# Patient Record
Sex: FEMALE | Race: WHITE | Employment: OTHER | URBAN - METROPOLITAN AREA
[De-identification: names, ages, dates, MRNs, and addresses within clinical notes are randomized per-mention and may not be internally consistent; named-entity substitution may affect disease eponyms.]

---

## 2017-02-01 ENCOUNTER — TELEPHONE (OUTPATIENT)
Dept: FAMILY MEDICINE CLINIC | Facility: CLINIC | Age: 67
End: 2017-02-01

## 2017-02-01 DIAGNOSIS — M81.0 OSTEOPOROSIS: Primary | ICD-10-CM

## 2017-02-01 RX ORDER — RISEDRONATE SODIUM 35 MG/1
TABLET, DELAYED RELEASE ORAL
Qty: 12 TABLET | Refills: 2 | Status: SHIPPED | OUTPATIENT
Start: 2017-02-01 | End: 2017-04-25

## 2017-02-01 NOTE — TELEPHONE ENCOUNTER
Patient requesting a refill of Risedronate Sodium 35 MG Oral Tab EC.  Patient would like sent into [facility] and would like a call once prescription has been sent through

## 2017-04-25 ENCOUNTER — OFFICE VISIT (OUTPATIENT)
Dept: FAMILY MEDICINE CLINIC | Facility: CLINIC | Age: 67
End: 2017-04-25

## 2017-04-25 ENCOUNTER — APPOINTMENT (OUTPATIENT)
Dept: LAB | Age: 67
End: 2017-04-25
Attending: FAMILY MEDICINE
Payer: COMMERCIAL

## 2017-04-25 VITALS
DIASTOLIC BLOOD PRESSURE: 80 MMHG | HEIGHT: 63.5 IN | BODY MASS INDEX: 28.17 KG/M2 | TEMPERATURE: 98 F | WEIGHT: 161 LBS | RESPIRATION RATE: 16 BRPM | HEART RATE: 60 BPM | SYSTOLIC BLOOD PRESSURE: 130 MMHG

## 2017-04-25 DIAGNOSIS — E55.9 VITAMIN D DEFICIENCY: ICD-10-CM

## 2017-04-25 DIAGNOSIS — M54.50 CHRONIC BILATERAL LOW BACK PAIN WITHOUT SCIATICA: ICD-10-CM

## 2017-04-25 DIAGNOSIS — I10 ESSENTIAL HYPERTENSION: Primary | ICD-10-CM

## 2017-04-25 DIAGNOSIS — G89.29 CHRONIC BILATERAL LOW BACK PAIN WITHOUT SCIATICA: ICD-10-CM

## 2017-04-25 DIAGNOSIS — E78.00 PURE HYPERCHOLESTEROLEMIA: ICD-10-CM

## 2017-04-25 DIAGNOSIS — R73.9 HYPERGLYCEMIA: ICD-10-CM

## 2017-04-25 DIAGNOSIS — Z12.31 VISIT FOR SCREENING MAMMOGRAM: ICD-10-CM

## 2017-04-25 DIAGNOSIS — M81.0 AGE RELATED OSTEOPOROSIS, UNSPECIFIED PATHOLOGICAL FRACTURE PRESENCE: ICD-10-CM

## 2017-04-25 PROCEDURE — 36415 COLL VENOUS BLD VENIPUNCTURE: CPT | Performed by: FAMILY MEDICINE

## 2017-04-25 PROCEDURE — 82306 VITAMIN D 25 HYDROXY: CPT | Performed by: FAMILY MEDICINE

## 2017-04-25 PROCEDURE — 99214 OFFICE O/P EST MOD 30 MIN: CPT | Performed by: FAMILY MEDICINE

## 2017-04-25 PROCEDURE — 80053 COMPREHEN METABOLIC PANEL: CPT | Performed by: FAMILY MEDICINE

## 2017-04-25 PROCEDURE — 80061 LIPID PANEL: CPT | Performed by: FAMILY MEDICINE

## 2017-04-25 PROCEDURE — 83036 HEMOGLOBIN GLYCOSYLATED A1C: CPT | Performed by: FAMILY MEDICINE

## 2017-04-25 RX ORDER — CITALOPRAM 40 MG/1
TABLET ORAL
Qty: 90 TABLET | Refills: 1 | Status: SHIPPED | OUTPATIENT
Start: 2017-04-25 | End: 2017-07-25

## 2017-04-25 RX ORDER — RISEDRONATE SODIUM 35 MG/1
TABLET, DELAYED RELEASE ORAL
Qty: 12 TABLET | Refills: 3 | Status: SHIPPED | OUTPATIENT
Start: 2017-04-25 | End: 2017-05-20

## 2017-04-25 RX ORDER — VALSARTAN AND HYDROCHLOROTHIAZIDE 160; 12.5 MG/1; MG/1
TABLET, FILM COATED ORAL
Qty: 90 TABLET | Refills: 1 | Status: SHIPPED | OUTPATIENT
Start: 2017-04-25 | End: 2017-07-25

## 2017-04-25 RX ORDER — METOPROLOL SUCCINATE 50 MG/1
TABLET, EXTENDED RELEASE ORAL
Qty: 90 TABLET | Refills: 1 | Status: SHIPPED | OUTPATIENT
Start: 2017-04-25 | End: 2017-07-25

## 2017-04-25 NOTE — PROGRESS NOTES
Marvin Martínez is a 61year old female. HPI:   Patient presents for a medication follow up. · Osteoporosis. On Risdronate since 2013 maybe? .  Last DEXA 1/18/16.   She takes calcium/mag and Vit D.   · HTN:  Valsarten-HCTZ 160-12.5mg daily and without murmur  EXT:  No pedal edema    ASSESSMENT AND PLAN:   1. Essential hypertension  BP controlled. CPM  - Metoprolol Succinate ER 50 MG Oral Tablet 24 Hr; TAKE 1 BY MOUTH DAILY  Dispense: 90 tablet; Refill: 1  - Valsartan-Hydrochlorothiazide 160-12.

## 2017-05-02 ENCOUNTER — HOSPITAL ENCOUNTER (OUTPATIENT)
Dept: MAMMOGRAPHY | Age: 67
Discharge: HOME | End: 2017-05-02
Attending: FAMILY MEDICINE
Payer: COMMERCIAL

## 2017-05-02 DIAGNOSIS — Z12.31 VISIT FOR SCREENING MAMMOGRAM: ICD-10-CM

## 2017-05-02 PROCEDURE — 77067 SCR MAMMO BI INCL CAD: CPT

## 2017-05-19 ENCOUNTER — PATIENT MESSAGE (OUTPATIENT)
Dept: FAMILY MEDICINE CLINIC | Facility: CLINIC | Age: 67
End: 2017-05-19

## 2017-05-19 DIAGNOSIS — M81.0 OSTEOPOROSIS, UNSPECIFIED OSTEOPOROSIS TYPE, UNSPECIFIED PATHOLOGICAL FRACTURE PRESENCE: Primary | ICD-10-CM

## 2017-05-19 NOTE — TELEPHONE ENCOUNTER
From: Neal De Los Santos  To: Sen Prather MD  Sent: 5/19/2017 1:00 PM CDT  Subject: Prescription Question    I just signed up for Medicare and am also now on the [address]. My problem is this: I currently am on Risedronate Sodium 35 mg.

## 2017-05-20 RX ORDER — ALENDRONATE SODIUM 70 MG/1
70 TABLET ORAL WEEKLY
Qty: 12 TABLET | Refills: 3 | Status: SHIPPED | OUTPATIENT
Start: 2017-05-20 | End: 2017-06-15

## 2017-05-20 NOTE — TELEPHONE ENCOUNTER
Fosamax 70mg once weekly. Administer first thing in the morning and >30 minutes before eating or drinking anything other than plain water.   Should stay upright for at least 30 minutes and until after first food of the day (to reduce esophageal irritation)

## 2017-06-15 ENCOUNTER — TELEPHONE (OUTPATIENT)
Dept: FAMILY MEDICINE CLINIC | Facility: CLINIC | Age: 67
End: 2017-06-15

## 2017-06-15 DIAGNOSIS — M81.0 OSTEOPOROSIS, UNSPECIFIED OSTEOPOROSIS TYPE, UNSPECIFIED PATHOLOGICAL FRACTURE PRESENCE: Primary | ICD-10-CM

## 2017-06-15 RX ORDER — ALENDRONATE SODIUM 70 MG/1
70 TABLET ORAL WEEKLY
Qty: 12 TABLET | Refills: 3 | Status: SHIPPED | OUTPATIENT
Start: 2017-06-15 | End: 2017-07-25

## 2017-06-15 NOTE — TELEPHONE ENCOUNTER
Alendronate Sodium 70 MG Oral Tab was sent to [address] [place] [address] but with patient's insurance she is to use [facility]. According to patient [facility] tried contacting our office needing additional information before processing order.  Patient would like us to co

## 2017-07-25 NOTE — TELEPHONE ENCOUNTER
Fax received from [facility] for request for scripts. Pt has [facility] on pharmacy list.  I called Pt to verify. Pt states has new insurance and will now need to use [facility]. Pt has refills left on all meds but unable to transfer.  [employer] will

## 2017-08-03 ENCOUNTER — TELEPHONE (OUTPATIENT)
Dept: FAMILY MEDICINE CLINIC | Facility: CLINIC | Age: 67
End: 2017-08-03

## 2017-08-09 ENCOUNTER — TELEPHONE (OUTPATIENT)
Dept: FAMILY MEDICINE CLINIC | Facility: CLINIC | Age: 67
End: 2017-08-09

## 2017-08-09 DIAGNOSIS — M81.0 OSTEOPOROSIS, UNSPECIFIED OSTEOPOROSIS TYPE, UNSPECIFIED PATHOLOGICAL FRACTURE PRESENCE: ICD-10-CM

## 2017-08-09 DIAGNOSIS — I10 ESSENTIAL HYPERTENSION: ICD-10-CM

## 2017-08-09 NOTE — TELEPHONE ENCOUNTER
Patient states she will be running out of medication by her next appointment 10-. Pt will need med refills on Metoprolol ER (Succ) 50Mg TAB, Alendronate 70mg Tab, and Valsartan/HCTZ 160.12.5 Mg tab sent to [facility].

## 2017-08-09 NOTE — TELEPHONE ENCOUNTER
This was just filled on 7/27/17 for 90 days no refills her appointment is 10/26/2017 with Dr Bob Oquendo her 90 days will run out before she is seen and mail order takes a week to get meds to her so she wants new RX sent can we send refills?

## 2017-08-10 RX ORDER — METOPROLOL SUCCINATE 50 MG/1
TABLET, EXTENDED RELEASE ORAL
Qty: 90 TABLET | Refills: 0 | Status: SHIPPED | OUTPATIENT
Start: 2017-08-10 | End: 2017-10-27

## 2017-08-10 RX ORDER — VALSARTAN AND HYDROCHLOROTHIAZIDE 160; 12.5 MG/1; MG/1
TABLET, FILM COATED ORAL
Qty: 90 TABLET | Refills: 0 | Status: SHIPPED | OUTPATIENT
Start: 2017-08-10 | End: 2017-10-27

## 2017-08-10 RX ORDER — ALENDRONATE SODIUM 70 MG/1
70 TABLET ORAL WEEKLY
Qty: 12 TABLET | Refills: 0 | Status: SHIPPED | OUTPATIENT
Start: 2017-08-10 | End: 2017-10-27

## 2017-10-10 DIAGNOSIS — F32.4 MAJOR DEPRESSIVE DISORDER WITH SINGLE EPISODE, IN PARTIAL REMISSION (HCC): ICD-10-CM

## 2017-10-11 RX ORDER — CITALOPRAM 40 MG/1
TABLET ORAL
Qty: 90 TABLET | Refills: 0 | Status: SHIPPED | OUTPATIENT
Start: 2017-10-11 | End: 2017-10-27

## 2017-10-20 ENCOUNTER — TELEPHONE (OUTPATIENT)
Dept: FAMILY MEDICINE CLINIC | Facility: CLINIC | Age: 67
End: 2017-10-20

## 2017-10-23 ENCOUNTER — MA CHART PREP (OUTPATIENT)
Dept: FAMILY MEDICINE CLINIC | Facility: CLINIC | Age: 67
End: 2017-10-23

## 2017-10-26 ENCOUNTER — OFFICE VISIT (OUTPATIENT)
Dept: FAMILY MEDICINE CLINIC | Facility: CLINIC | Age: 67
End: 2017-10-26

## 2017-10-26 VITALS
WEIGHT: 163 LBS | SYSTOLIC BLOOD PRESSURE: 138 MMHG | HEART RATE: 72 BPM | HEIGHT: 63.25 IN | RESPIRATION RATE: 16 BRPM | DIASTOLIC BLOOD PRESSURE: 80 MMHG | BODY MASS INDEX: 28.53 KG/M2 | TEMPERATURE: 98 F

## 2017-10-26 DIAGNOSIS — R73.9 HYPERGLYCEMIA: ICD-10-CM

## 2017-10-26 DIAGNOSIS — F32.4 MAJOR DEPRESSIVE DISORDER WITH SINGLE EPISODE, IN PARTIAL REMISSION (HCC): ICD-10-CM

## 2017-10-26 DIAGNOSIS — E55.9 VITAMIN D DEFICIENCY: ICD-10-CM

## 2017-10-26 DIAGNOSIS — K57.90 DIVERTICULOSIS OF INTESTINE WITHOUT BLEEDING, UNSPECIFIED INTESTINAL TRACT LOCATION: ICD-10-CM

## 2017-10-26 DIAGNOSIS — I10 ESSENTIAL HYPERTENSION: ICD-10-CM

## 2017-10-26 DIAGNOSIS — Z86.39: ICD-10-CM

## 2017-10-26 DIAGNOSIS — M81.0 OSTEOPOROSIS, UNSPECIFIED OSTEOPOROSIS TYPE, UNSPECIFIED PATHOLOGICAL FRACTURE PRESENCE: ICD-10-CM

## 2017-10-26 DIAGNOSIS — G47.33 OSA (OBSTRUCTIVE SLEEP APNEA): ICD-10-CM

## 2017-10-26 DIAGNOSIS — Z00.00 LABORATORY EXAM ORDERED AS PART OF ROUTINE GENERAL MEDICAL EXAMINATION: ICD-10-CM

## 2017-10-26 DIAGNOSIS — N95.2 VAGINAL ATROPHY: ICD-10-CM

## 2017-10-26 DIAGNOSIS — Z23 NEED FOR VACCINATION: ICD-10-CM

## 2017-10-26 DIAGNOSIS — J47.9 BRONCHIECTASIS WITHOUT COMPLICATION (HCC): ICD-10-CM

## 2017-10-26 DIAGNOSIS — K63.5 POLYP OF COLON, UNSPECIFIED PART OF COLON, UNSPECIFIED TYPE: ICD-10-CM

## 2017-10-26 DIAGNOSIS — K64.8 INTERNAL HEMORRHOIDS: ICD-10-CM

## 2017-10-26 DIAGNOSIS — Z00.00 ENCOUNTER FOR ANNUAL HEALTH EXAMINATION: Primary | ICD-10-CM

## 2017-10-26 PROCEDURE — 96160 PT-FOCUSED HLTH RISK ASSMT: CPT | Performed by: FAMILY MEDICINE

## 2017-10-26 PROCEDURE — G0009 ADMIN PNEUMOCOCCAL VACCINE: HCPCS | Performed by: FAMILY MEDICINE

## 2017-10-26 PROCEDURE — 90732 PPSV23 VACC 2 YRS+ SUBQ/IM: CPT | Performed by: FAMILY MEDICINE

## 2017-10-26 RX ORDER — ESTRADIOL 0.1 MG/G
CREAM VAGINAL
Qty: 42.5 G | Refills: 1 | Status: SHIPPED | OUTPATIENT
Start: 2017-10-26 | End: 2017-11-03

## 2017-10-26 NOTE — PROGRESS NOTES
HPI:   Eveline Johnson is a 74year old female who presents for a MA (Medicare Advantage) Supervisit (Once per calendar year). · Osteoporosis. On Risdronate since [id number](?). Switched over to Alendronate for cost savings. Last DEXA 12/2015. partial remission ([facility]     [facility] - [place] [facility]      Last Cholesterol Labs:     Lab Results  Component Value Date   CHOLEST 199 04/25/2017   HDL 53 04/25/2017    04/25/2017   TRIG 133 04/25/2017          Last Chemistr Cancer in her brother; Other in her father; Psychiatric in her daughter; other in her father. SOCIAL HISTORY:   She  reports that she has never smoked. She has never used smokeless tobacco. She reports that she drinks alcohol.  She reports that she does n and rhythm  Extremities: extremities normal, atraumatic, no cyanosis or edema    SUGGESTED VACCINATIONS - Influenza, Pneumococcal, Zoster, Tetanus     Immunization History   Administered Date(s) Administered   • Fluad High dose 72 yr and older (42647) 10/1 Epic. Discussed with patient and provided information       [facility] on file in Epic:    Felix Bo does not have a Power of  for [employer] on file in [place].  Discussed with patient and provided information the last two weeks)?: Not at all    PHQ-2 SCORE: 0        Advance Directives     Do you have a healthcare power of ?: No    Do you have a living will?: No     Please go to \"Cognitive Assessment\" under Medicare Assessment section in Charting, test Pelvic      Pap: Every 3 yrs age 21-68 or Pap+HPV every 5 yrs age 33-67, age 72 and older at high risk There are no preventive care reminders to display for this patient.  Update Health Maintenance if applicable    Chlamydia  Annually if high risk No result 0.96    No flowsheet data found. BUN  Annually BUN (mg/dL)   Date Value   04/25/2017 15     Blood Urea Nitrogen (milligrams per deciliter)   Date Value   06/23/2015 14    No flowsheet data found.      Drug Serum Conc  Annually No results found for: [address]

## 2017-10-26 NOTE — PATIENT INSTRUCTIONS
ISAAC Gil 19 SCREENING SCHEDULE   Tests on this list are recommended by your physician but may not be covered, or covered at this frequency, by your insurer. Please check with your insurance carrier before scheduling to verify coverage.    PREV any previous visit.  Limited to patients who meet one of the following criteria:   • Men who are 73-68 years old and have smoked more than 100 cigarettes in their lifetime   • Anyone with a family history    Colorectal Cancer Screening  Covered up to Age 76 Mammogram regularly   Immunizations      Influenza  Covered Annually No orders found for this or any previous visit. Please get every year    Pneumococcal 13 (Prevnar)  Covered Once after 65 No orders found for this or any previous visit.  Please get once a

## 2017-10-27 PROBLEM — J47.9 BRONCHIECTASIS WITHOUT COMPLICATION (HCC): Status: ACTIVE | Noted: 2017-10-27

## 2017-10-27 RX ORDER — METOPROLOL SUCCINATE 50 MG/1
TABLET, EXTENDED RELEASE ORAL
Qty: 90 TABLET | Refills: 1 | Status: SHIPPED | OUTPATIENT
Start: 2017-10-27 | End: 2018-08-21

## 2017-10-27 RX ORDER — CITALOPRAM 40 MG/1
40 TABLET ORAL
Qty: 90 TABLET | Refills: 1 | Status: SHIPPED | OUTPATIENT
Start: 2017-10-27 | End: 2018-02-02

## 2017-10-27 RX ORDER — VALSARTAN AND HYDROCHLOROTHIAZIDE 160; 12.5 MG/1; MG/1
TABLET, FILM COATED ORAL
Qty: 90 TABLET | Refills: 1 | Status: SHIPPED | OUTPATIENT
Start: 2017-10-27 | End: 2018-05-02

## 2017-10-27 RX ORDER — ALENDRONATE SODIUM 70 MG/1
70 TABLET ORAL WEEKLY
Qty: 12 TABLET | Refills: 1 | Status: SHIPPED | OUTPATIENT
Start: 2017-10-27 | End: 2018-08-21

## 2017-11-03 ENCOUNTER — TELEPHONE (OUTPATIENT)
Dept: FAMILY MEDICINE CLINIC | Facility: CLINIC | Age: 67
End: 2017-11-03

## 2017-11-03 RX ORDER — ESTRADIOL 0.1 MG/G
CREAM VAGINAL
Qty: 42.5 G | Refills: 1 | Status: SHIPPED | OUTPATIENT
Start: 2017-11-03 | End: 2018-02-02

## 2017-11-03 NOTE — TELEPHONE ENCOUNTER
Fax transmission requests clarification on rx for estrace. Verification needed on how many grams per application. Please complete form and fax to [phone]. Form in Dr Gonzalez Jones.   Routed to Dr Mario Corey

## 2017-11-08 ENCOUNTER — TELEPHONE (OUTPATIENT)
Dept: FAMILY MEDICINE CLINIC | Facility: CLINIC | Age: 67
End: 2017-11-08

## 2017-11-08 NOTE — TELEPHONE ENCOUNTER
Shanell Pena from [facility] requesting clarification/directions on pt's med Losartan Potassium 25 MG Oral Tab

## 2018-01-08 DIAGNOSIS — F32.4 MAJOR DEPRESSIVE DISORDER WITH SINGLE EPISODE, IN PARTIAL REMISSION (HCC): ICD-10-CM

## 2018-01-10 RX ORDER — CITALOPRAM 40 MG/1
TABLET ORAL
Qty: 90 TABLET | Refills: 0 | OUTPATIENT
Start: 2018-01-10

## 2018-01-10 NOTE — TELEPHONE ENCOUNTER
LOV 10/26/2017 and at that time, pt was given Rx for 6 months. Pt should have a refill. Denied refill request.    No future appointments.      Refill request for:      Pending Prescriptions Disp Refills    CITALOPRAM HYDROBROMIDE 40 MG Oral Tab [Pharmacy Me

## 2018-01-24 ENCOUNTER — HOSPITAL ENCOUNTER (OUTPATIENT)
Dept: BONE DENSITY | Age: 68
Discharge: HOME | End: 2018-01-24
Attending: FAMILY MEDICINE
Payer: MEDICARE

## 2018-01-24 DIAGNOSIS — M81.0 OSTEOPOROSIS, UNSPECIFIED OSTEOPOROSIS TYPE, UNSPECIFIED PATHOLOGICAL FRACTURE PRESENCE: ICD-10-CM

## 2018-01-24 PROCEDURE — 77080 DXA BONE DENSITY AXIAL: CPT | Performed by: FAMILY MEDICINE

## 2018-01-29 ENCOUNTER — TELEPHONE (OUTPATIENT)
Dept: FAMILY MEDICINE CLINIC | Facility: CLINIC | Age: 68
End: 2018-01-29

## 2018-02-02 ENCOUNTER — OFFICE VISIT (OUTPATIENT)
Dept: FAMILY MEDICINE CLINIC | Facility: CLINIC | Age: 68
End: 2018-02-02

## 2018-02-02 VITALS
SYSTOLIC BLOOD PRESSURE: 128 MMHG | WEIGHT: 169 LBS | TEMPERATURE: 98 F | DIASTOLIC BLOOD PRESSURE: 78 MMHG | HEART RATE: 72 BPM | HEIGHT: 63.5 IN | BODY MASS INDEX: 29.57 KG/M2

## 2018-02-02 DIAGNOSIS — F32.4 MAJOR DEPRESSIVE DISORDER WITH SINGLE EPISODE, IN PARTIAL REMISSION (HCC): ICD-10-CM

## 2018-02-02 DIAGNOSIS — K63.5 POLYP OF COLON, UNSPECIFIED PART OF COLON, UNSPECIFIED TYPE: ICD-10-CM

## 2018-02-02 DIAGNOSIS — M81.0 OSTEOPOROSIS, UNSPECIFIED OSTEOPOROSIS TYPE, UNSPECIFIED PATHOLOGICAL FRACTURE PRESENCE: ICD-10-CM

## 2018-02-02 DIAGNOSIS — E55.9 VITAMIN D DEFICIENCY: ICD-10-CM

## 2018-02-02 DIAGNOSIS — K57.90 DIVERTICULOSIS OF INTESTINE WITHOUT BLEEDING, UNSPECIFIED INTESTINAL TRACT LOCATION: ICD-10-CM

## 2018-02-02 DIAGNOSIS — K64.8 INTERNAL HEMORRHOIDS: ICD-10-CM

## 2018-02-02 DIAGNOSIS — J47.9 BRONCHIECTASIS WITHOUT COMPLICATION (HCC): ICD-10-CM

## 2018-02-02 DIAGNOSIS — R73.9 HYPERGLYCEMIA: ICD-10-CM

## 2018-02-02 DIAGNOSIS — Z86.39: ICD-10-CM

## 2018-02-02 DIAGNOSIS — Z00.00 ENCOUNTER FOR ANNUAL HEALTH EXAMINATION: Primary | ICD-10-CM

## 2018-02-02 DIAGNOSIS — I10 ESSENTIAL HYPERTENSION: ICD-10-CM

## 2018-02-02 DIAGNOSIS — G47.33 OSA (OBSTRUCTIVE SLEEP APNEA): ICD-10-CM

## 2018-02-02 PROCEDURE — 96160 PT-FOCUSED HLTH RISK ASSMT: CPT | Performed by: FAMILY MEDICINE

## 2018-02-02 PROCEDURE — G0438 PPPS, INITIAL VISIT: HCPCS | Performed by: FAMILY MEDICINE

## 2018-02-02 RX ORDER — CITALOPRAM 40 MG/1
40 TABLET ORAL
Qty: 90 TABLET | Refills: 1 | Status: SHIPPED | OUTPATIENT
Start: 2018-02-02 | End: 2018-08-21

## 2018-02-02 NOTE — PATIENT INSTRUCTIONS
ISAAC Gil 19 SCREENING SCHEDULE   Tests on this list are recommended by your physician but may not be covered, or covered at this frequency, by your insurer. Please check with your insurance carrier before scheduling to verify coverage.    PREV results found for this or any previous visit.  Limited to patients who meet one of the following criteria:   • Men who are 73-68 years old and have smoked more than 100 cigarettes in their lifetime   • Anyone with a family history    Colorectal Cancer Scree 05/02/2018 Please get this Mammogram regularly   Immunizations      Influenza  Covered Annually No orders found for this or any previous visit.  Please get every year    Pneumococcal 13 (Prevnar)  Covered Once after 65 No orders found for this or any previo Advance Directives.

## 2018-02-02 NOTE — PROGRESS NOTES
HPI:   Kathrin Tom is a 74year old female who presents for a MA (Medicare Advantage) Supervisit (Once per calendar year). · Osteoporosis. On Risdronate since [id number](?). Switched over to Alendronate for cost savings. Last DEXA 1/2018. below:  She has no issues with dressing and bathing based on screening of functional status. She has Walking problems based on screening of functional status.    Difficulty walking?: Yes             Depression Screening (PHQ-2/PH TRIG 133 04/25/2017          Last Chemistry Labs:     Lab Results  Component Value Date   AST 21 04/25/2017   ALT 27 04/25/2017   CA 9.2 04/25/2017   ALB 4.1 04/25/2017   CREATSERUM 0.96 04/25/2017   GLU 88 04/25/2017        CBC  (most recent labs)     L No vision or hearing concerns. No dental problems. HEART:  No chest pain or palpitations  LUNG:  No SOB, cough or wheeze  GI:  No abdominal pain. No N/V/D/C  :  No dysuria  EXT:  No joint pain.   No LE swelling  PSYCH:  No mood concerns or anxiety    E (40 mg total) by mouth once daily. Dispense: 90 tablet; Refill: 1    3. CATE (obstructive sleep apnea)  Uses CPAP    4. Vitamin D deficiency  Takes supplements    5. Hx of benign neoplasm of parathyroid gland  Surgical resection    6.  Hyperglycemia  A1c st Cardiovascular Disease Screening     LDL Annually LDL Cholesterol Calc (milligrams per deciliter)   Date Value   02/11/2013 151 (H)     LDL Cholesterol (mg/dL)   Date Value   04/25/2017 119        EKG - w/ Initial Preventative Physical Exam only, or if m mentally retarded   Persons who live in the same house as a HepB virus carrier   Homosexual men   Illicit injectable drug abusers     Tetanus Toxoid  Only covered with a cut with metal- TD and TDaP Not covered by Medicare Part B No vaccine history found Th

## 2018-05-02 DIAGNOSIS — I10 ESSENTIAL HYPERTENSION: ICD-10-CM

## 2018-05-02 RX ORDER — VALSARTAN AND HYDROCHLOROTHIAZIDE 160; 12.5 MG/1; MG/1
TABLET, FILM COATED ORAL
Qty: 90 TABLET | Refills: 1 | Status: SHIPPED | OUTPATIENT
Start: 2018-05-02 | End: 2018-07-27

## 2018-07-25 ENCOUNTER — TELEPHONE (OUTPATIENT)
Dept: FAMILY MEDICINE CLINIC | Facility: CLINIC | Age: 68
End: 2018-07-25

## 2018-07-25 NOTE — TELEPHONE ENCOUNTER
Fax received from [facility] [employer] [facility] requesting a new prescription to replace Valsartan/HCTZ 160/12.5 mg because of a 's recall. LOV 2/2/18. No future appointments. Routed to Dr Cathryn Farias.

## 2018-07-27 ENCOUNTER — TELEPHONE (OUTPATIENT)
Dept: FAMILY MEDICINE CLINIC | Facility: CLINIC | Age: 68
End: 2018-07-27

## 2018-07-27 DIAGNOSIS — Z12.31 ENCOUNTER FOR SCREENING MAMMOGRAM FOR BREAST CANCER: Primary | ICD-10-CM

## 2018-07-27 RX ORDER — LOSARTAN POTASSIUM AND HYDROCHLOROTHIAZIDE 12.5; 1 MG/1; MG/1
1 TABLET ORAL DAILY
Qty: 90 TABLET | Refills: 1 | Status: SHIPPED | OUTPATIENT
Start: 2018-07-27 | End: 2018-07-30

## 2018-07-27 NOTE — TELEPHONE ENCOUNTER
Patient came into office and dropped off [facility] for substitute for Valsartan. Also she is requesting an order for her mammogram.  Central scheduling number given to patient.   Form in triage in refill zaria/jb DM (diabetes mellitus)

## 2018-07-27 NOTE — TELEPHONE ENCOUNTER
From: Nallely Mendez  Sent: 7/27/2018 4:52 PM CDT  Subject: Medication Renewal Request    Nallely Mendez would like a refill of the following medications:     Losartan Potassium-HCTZ 100-12.5 MG Oral Tab Yoav Hopson MD]   Patient Commen

## 2018-07-27 NOTE — TELEPHONE ENCOUNTER
Notified pt that mammogram has been ordered and Losartan/HCTZ was sent to THE [facility]. Pt verbalized understandiong.

## 2018-07-30 RX ORDER — LOSARTAN POTASSIUM AND HYDROCHLOROTHIAZIDE 12.5; 1 MG/1; MG/1
1 TABLET ORAL DAILY
Qty: 90 TABLET | Refills: 1 | Status: SHIPPED
Start: 2018-07-30 | End: 2018-08-21 | Stop reason: ALTCHOICE

## 2018-07-30 NOTE — TELEPHONE ENCOUNTER
From: Forrest Peña  Sent: 7/30/2018 3:17 PM CDT  Subject: Medication Renewal Request    Forrest Peña would like a refill of the following medications:     Losartan Potassium-HCTZ 100-12.5 MG Oral Tab Dank Starr MD]   Patient Commen

## 2018-07-30 NOTE — TELEPHONE ENCOUNTER
From: Alisha Pride  Sent: 7/30/2018 9:12 AM CDT  Subject: Medication Renewal Request    Alisha Pride would like a refill of the following medications:     Losartan Potassium-HCTZ 100-12.5 MG Oral Tab Jae Linares MD]   Patient Commen

## 2018-07-31 RX ORDER — LOSARTAN POTASSIUM AND HYDROCHLOROTHIAZIDE 12.5; 1 MG/1; MG/1
1 TABLET ORAL DAILY
Qty: 90 TABLET | Refills: 1 | Status: SHIPPED | OUTPATIENT
Start: 2018-07-31 | End: 2018-08-21

## 2018-08-01 ENCOUNTER — HOSPITAL ENCOUNTER (OUTPATIENT)
Dept: MAMMOGRAPHY | Age: 68
Discharge: HOME | End: 2018-08-01
Attending: FAMILY MEDICINE
Payer: MEDICARE

## 2018-08-01 DIAGNOSIS — Z12.31 ENCOUNTER FOR SCREENING MAMMOGRAM FOR BREAST CANCER: ICD-10-CM

## 2018-08-01 PROCEDURE — 77067 SCR MAMMO BI INCL CAD: CPT | Performed by: FAMILY MEDICINE

## 2018-08-01 PROCEDURE — 77063 BREAST TOMOSYNTHESIS BI: CPT | Performed by: FAMILY MEDICINE

## 2018-08-07 ENCOUNTER — HOSPITAL ENCOUNTER (OUTPATIENT)
Dept: MAMMOGRAPHY | Facility: HOSPITAL | Age: 68
Discharge: HOME | End: 2018-08-07
Attending: FAMILY MEDICINE
Payer: MEDICARE

## 2018-08-07 DIAGNOSIS — R92.2 INCONCLUSIVE MAMMOGRAM: ICD-10-CM

## 2018-08-07 PROCEDURE — 77061 BREAST TOMOSYNTHESIS UNI: CPT | Performed by: FAMILY MEDICINE

## 2018-08-07 PROCEDURE — 77065 DX MAMMO INCL CAD UNI: CPT | Performed by: FAMILY MEDICINE

## 2018-08-07 PROCEDURE — 76642 ULTRASOUND BREAST LIMITED: CPT | Performed by: FAMILY MEDICINE

## 2018-08-21 ENCOUNTER — OFFICE VISIT (OUTPATIENT)
Dept: FAMILY MEDICINE CLINIC | Facility: CLINIC | Age: 68
End: 2018-08-21

## 2018-08-21 VITALS
SYSTOLIC BLOOD PRESSURE: 126 MMHG | BODY MASS INDEX: 30 KG/M2 | RESPIRATION RATE: 14 BRPM | DIASTOLIC BLOOD PRESSURE: 78 MMHG | WEIGHT: 171 LBS | HEART RATE: 72 BPM

## 2018-08-21 DIAGNOSIS — I10 ESSENTIAL HYPERTENSION: Primary | ICD-10-CM

## 2018-08-21 DIAGNOSIS — N95.2 VAGINAL ATROPHY: ICD-10-CM

## 2018-08-21 DIAGNOSIS — F32.4 MAJOR DEPRESSIVE DISORDER WITH SINGLE EPISODE, IN PARTIAL REMISSION (HCC): ICD-10-CM

## 2018-08-21 PROCEDURE — 99214 OFFICE O/P EST MOD 30 MIN: CPT | Performed by: FAMILY MEDICINE

## 2018-08-21 RX ORDER — METOPROLOL SUCCINATE 50 MG/1
TABLET, EXTENDED RELEASE ORAL
Qty: 90 TABLET | Refills: 1 | Status: SHIPPED | OUTPATIENT
Start: 2018-08-21 | End: 2018-12-23

## 2018-08-21 RX ORDER — CITALOPRAM 40 MG/1
40 TABLET ORAL
Qty: 90 TABLET | Refills: 1 | Status: SHIPPED | OUTPATIENT
Start: 2018-08-21 | End: 2018-12-23

## 2018-08-21 RX ORDER — ALENDRONATE SODIUM 70 MG/1
70 TABLET ORAL WEEKLY
Qty: 12 TABLET | Refills: 1 | Status: SHIPPED | OUTPATIENT
Start: 2018-08-21 | End: 2018-12-23

## 2018-08-21 RX ORDER — LOSARTAN POTASSIUM AND HYDROCHLOROTHIAZIDE 12.5; 1 MG/1; MG/1
1 TABLET ORAL DAILY
Qty: 90 TABLET | Refills: 1 | Status: SHIPPED | OUTPATIENT
Start: 2018-08-21 | End: 2018-12-23

## 2018-08-21 RX ORDER — ESTRADIOL 0.1 MG/G
CREAM VAGINAL
Qty: 42.5 G | Refills: 1 | Status: SHIPPED | OUTPATIENT
Start: 2018-08-21 | End: 2019-02-11

## 2018-08-22 NOTE — PROGRESS NOTES
Manjit Liu is a 79year old female. HPI:   Patient presents for a medication follow up. Changed to losartan with valsartan recall. BP good. No side effects. Would like to try estrace again, previously too expensive.   But would like prescription printed. Return in 6 months for medicare wellness      No orders of the defined types were placed in this encounter.     Meds & Refills for this Visit:  Signed Prescriptions Disp Refills    alendronate 70 MG Oral Tab 12 tablet 1      Sig: Ta

## 2018-09-13 ENCOUNTER — TELEPHONE (OUTPATIENT)
Dept: FAMILY MEDICINE CLINIC | Facility: CLINIC | Age: 68
End: 2018-09-13

## 2018-09-13 RX ORDER — ESTRADIOL 1 MG/1
1 TABLET ORAL DAILY
Qty: 30 TABLET | Refills: 0 | OUTPATIENT
Start: 2018-09-13

## 2018-09-13 NOTE — TELEPHONE ENCOUNTER
There is a vaginal tablet, but one her pharmacy recommended is an oral tablet.   They can not be used vaginally

## 2018-09-13 NOTE — TELEPHONE ENCOUNTER
Spoke with Jacob Lizarraga. Told her  does not recommend oral Estradiol tablets. Jacob Lizarraga was under the impression that her friend is using estrodial tablets inserted vaginally before intercourse.    She is going to explore [facility] to see if she can get

## 2018-09-13 NOTE — TELEPHONE ENCOUNTER
Corona Orta was not aware the tablets were oral, she thought they were vaginal.  Discussed with . She has 4 boxes of premarin vaginal cream sample for the patient. Please call.

## 2018-09-13 NOTE — TELEPHONE ENCOUNTER
Medication change request sent to Dr Sheela Babb original Rx was issued at appt on 8/21/18 for Estradiol 0.1mg/GM cream  PENDING is Estradiol 1 mg tabs, no refills for Dr Karin Essex consideration

## 2018-09-13 NOTE — TELEPHONE ENCOUNTER
Patient walked in to office this afternoon. She recently saw Dr. Kendy Almaraz and was given Estradiol Vaginal cream.   She found out it was very expensive over 100.00.    She went to [facility] and was told if she gets Estradial 30 tablets 1 mg with a coupon will be

## 2018-12-23 DIAGNOSIS — F32.4 MAJOR DEPRESSIVE DISORDER WITH SINGLE EPISODE, IN PARTIAL REMISSION (HCC): ICD-10-CM

## 2018-12-23 DIAGNOSIS — I10 ESSENTIAL HYPERTENSION: ICD-10-CM

## 2018-12-27 ENCOUNTER — TELEPHONE (OUTPATIENT)
Dept: FAMILY MEDICINE CLINIC | Facility: CLINIC | Age: 68
End: 2018-12-27

## 2018-12-27 RX ORDER — LOSARTAN POTASSIUM AND HYDROCHLOROTHIAZIDE 12.5; 1 MG/1; MG/1
1 TABLET ORAL DAILY
Qty: 90 TABLET | Refills: 2 | Status: SHIPPED | OUTPATIENT
Start: 2018-12-27 | End: 2019-05-15 | Stop reason: ALTCHOICE

## 2018-12-27 RX ORDER — METOPROLOL SUCCINATE 50 MG/1
TABLET, EXTENDED RELEASE ORAL
Qty: 90 TABLET | Refills: 2 | Status: SHIPPED | OUTPATIENT
Start: 2018-12-27 | End: 2019-03-25

## 2018-12-27 RX ORDER — CITALOPRAM 40 MG/1
40 TABLET ORAL
Qty: 90 TABLET | Refills: 1 | Status: SHIPPED | OUTPATIENT
Start: 2018-12-27 | End: 2019-03-25

## 2018-12-27 RX ORDER — ALENDRONATE SODIUM 70 MG/1
70 TABLET ORAL WEEKLY
Qty: 12 TABLET | Refills: 2 | Status: SHIPPED | OUTPATIENT
Start: 2018-12-27 | End: 2019-03-25

## 2018-12-27 NOTE — TELEPHONE ENCOUNTER
alendronate 70 MG Oral Tab          Sig: Take 1 tablet (70 mg total) by mouth once a week. Take in the morning and 30 minutes before eating or drinking take with plain water.     Disp:  12 tablet    Refills:  1    Osteoporosis Medication Protocol [id number]

## 2018-12-27 NOTE — TELEPHONE ENCOUNTER
No future appts scheduled  Last CMP on 4/25/17 WNL, no outstanding lab orders  LOV on 8/21/18,  with Dr Chiqui Valdivia who noted to CPM for HTN Metoprolol Succinate 50mgs  Also noted changed to losartan from valsartan due to recall  Therefore will refill these two

## 2019-01-23 ENCOUNTER — TELEPHONE (OUTPATIENT)
Dept: FAMILY MEDICINE CLINIC | Facility: CLINIC | Age: 69
End: 2019-01-23

## 2019-01-23 DIAGNOSIS — E55.9 VITAMIN D DEFICIENCY: ICD-10-CM

## 2019-01-23 DIAGNOSIS — R73.9 HYPERGLYCEMIA: Primary | ICD-10-CM

## 2019-01-23 DIAGNOSIS — E78.5 DYSLIPIDEMIA: ICD-10-CM

## 2019-01-23 NOTE — TELEPHONE ENCOUNTER
Please enter lab orders for the patient's upcoming physical appointment. Physical scheduled: 2/11/19     Preferred lab: [facility]     The patient has been notified to complete fasting labs prior to their physical appointment.

## 2019-02-05 ENCOUNTER — TELEPHONE (OUTPATIENT)
Dept: FAMILY MEDICINE CLINIC | Facility: CLINIC | Age: 69
End: 2019-02-05

## 2019-02-11 ENCOUNTER — APPOINTMENT (OUTPATIENT)
Dept: LAB | Age: 69
End: 2019-02-11
Attending: FAMILY MEDICINE
Payer: MEDICARE

## 2019-02-11 ENCOUNTER — OFFICE VISIT (OUTPATIENT)
Dept: FAMILY MEDICINE CLINIC | Facility: CLINIC | Age: 69
End: 2019-02-11
Payer: MEDICARE

## 2019-02-11 VITALS
RESPIRATION RATE: 17 BRPM | SYSTOLIC BLOOD PRESSURE: 114 MMHG | WEIGHT: 168 LBS | DIASTOLIC BLOOD PRESSURE: 60 MMHG | HEART RATE: 80 BPM | BODY MASS INDEX: 29 KG/M2

## 2019-02-11 DIAGNOSIS — N39.3 STRESS INCONTINENCE: ICD-10-CM

## 2019-02-11 DIAGNOSIS — R73.9 HYPERGLYCEMIA: ICD-10-CM

## 2019-02-11 DIAGNOSIS — E78.5 DYSLIPIDEMIA: ICD-10-CM

## 2019-02-11 DIAGNOSIS — M81.0 AGE-RELATED OSTEOPOROSIS WITHOUT CURRENT PATHOLOGICAL FRACTURE: ICD-10-CM

## 2019-02-11 DIAGNOSIS — E55.9 VITAMIN D DEFICIENCY: ICD-10-CM

## 2019-02-11 DIAGNOSIS — I10 ESSENTIAL HYPERTENSION: ICD-10-CM

## 2019-02-11 DIAGNOSIS — G47.33 OSA (OBSTRUCTIVE SLEEP APNEA): ICD-10-CM

## 2019-02-11 DIAGNOSIS — Z00.00 ENCOUNTER FOR ANNUAL HEALTH EXAMINATION: Primary | ICD-10-CM

## 2019-02-11 DIAGNOSIS — J47.9 BRONCHIECTASIS WITHOUT COMPLICATION (HCC): ICD-10-CM

## 2019-02-11 DIAGNOSIS — Z11.59 NEED FOR HEPATITIS C SCREENING TEST: ICD-10-CM

## 2019-02-11 DIAGNOSIS — F32.4 MAJOR DEPRESSIVE DISORDER WITH SINGLE EPISODE, IN PARTIAL REMISSION (HCC): ICD-10-CM

## 2019-02-11 DIAGNOSIS — K64.8 INTERNAL HEMORRHOIDS: ICD-10-CM

## 2019-02-11 DIAGNOSIS — K63.5 POLYP OF COLON, UNSPECIFIED PART OF COLON, UNSPECIFIED TYPE: ICD-10-CM

## 2019-02-11 DIAGNOSIS — Z86.39: ICD-10-CM

## 2019-02-11 DIAGNOSIS — K57.90 DIVERTICULOSIS OF INTESTINE WITHOUT BLEEDING, UNSPECIFIED INTESTINAL TRACT LOCATION: ICD-10-CM

## 2019-02-11 LAB
ALBUMIN SERPL-MCNC: 3.7 G/DL (ref 3.1–4.5)
ALBUMIN/GLOB SERPL: 0.9 {RATIO} (ref 1–2)
ALP LIVER SERPL-CCNC: 64 U/L (ref 55–142)
ALT SERPL-CCNC: 21 U/L (ref 14–54)
ANION GAP SERPL CALC-SCNC: 7 MMOL/L (ref 0–18)
AST SERPL-CCNC: 14 U/L (ref 15–41)
BILIRUB SERPL-MCNC: 0.5 MG/DL (ref 0.1–2)
BUN BLD-MCNC: 15 MG/DL (ref 8–20)
BUN/CREAT SERPL: 14.2 (ref 10–20)
CALCIUM BLD-MCNC: 8.8 MG/DL (ref 8.3–10.3)
CHLORIDE SERPL-SCNC: 104 MMOL/L (ref 101–111)
CHOLEST SMN-MCNC: 194 MG/DL (ref ?–200)
CO2 SERPL-SCNC: 30 MMOL/L (ref 22–32)
CREAT BLD-MCNC: 1.06 MG/DL (ref 0.55–1.02)
EST. AVERAGE GLUCOSE BLD GHB EST-MCNC: 140 MG/DL (ref 68–126)
GLOBULIN PLAS-MCNC: 4.3 G/DL (ref 2.8–4.4)
GLUCOSE BLD-MCNC: 109 MG/DL (ref 70–99)
HBA1C MFR BLD HPLC: 6.5 % (ref ?–5.7)
HCV AB SERPL QL IA: NONREACTIVE
HDLC SERPL-MCNC: 44 MG/DL (ref 40–59)
LDLC SERPL CALC-MCNC: 117 MG/DL (ref ?–100)
M PROTEIN MFR SERPL ELPH: 8 G/DL (ref 6.4–8.2)
NONHDLC SERPL-MCNC: 150 MG/DL (ref ?–130)
OSMOLALITY SERPL CALC.SUM OF ELEC: 293 MOSM/KG (ref 275–295)
POTASSIUM SERPL-SCNC: 3.9 MMOL/L (ref 3.6–5.1)
SODIUM SERPL-SCNC: 141 MMOL/L (ref 136–144)
TRIGL SERPL-MCNC: 164 MG/DL (ref 30–149)
VIT D+METAB SERPL-MCNC: 100.8 NG/ML (ref 30–100)
VLDLC SERPL CALC-MCNC: 33 MG/DL (ref 0–30)

## 2019-02-11 PROCEDURE — 96160 PT-FOCUSED HLTH RISK ASSMT: CPT | Performed by: FAMILY MEDICINE

## 2019-02-11 PROCEDURE — 83036 HEMOGLOBIN GLYCOSYLATED A1C: CPT

## 2019-02-11 PROCEDURE — 86803 HEPATITIS C AB TEST: CPT

## 2019-02-11 PROCEDURE — G0439 PPPS, SUBSEQ VISIT: HCPCS | Performed by: FAMILY MEDICINE

## 2019-02-11 PROCEDURE — 80053 COMPREHEN METABOLIC PANEL: CPT

## 2019-02-11 PROCEDURE — 99397 PER PM REEVAL EST PAT 65+ YR: CPT | Performed by: FAMILY MEDICINE

## 2019-02-11 PROCEDURE — 80061 LIPID PANEL: CPT

## 2019-02-11 PROCEDURE — 82306 VITAMIN D 25 HYDROXY: CPT

## 2019-02-11 RX ORDER — BORON CITRATE 3 MG
7 TABLET ORAL
COMMUNITY

## 2019-02-11 RX ORDER — CLONAZEPAM 1 MG/1
1 TABLET ORAL DAILY PRN
Qty: 30 TABLET | Refills: 0 | Status: SHIPPED | OUTPATIENT
Start: 2019-02-11

## 2019-02-11 RX ORDER — ESTRADIOL 0.1 MG/G
CREAM VAGINAL
Qty: 42.5 G | Refills: 1 | Status: SHIPPED | OUTPATIENT
Start: 2019-02-11 | End: 2019-09-27

## 2019-02-11 NOTE — PATIENT INSTRUCTIONS
ISAAC Gil 19 SCREENING SCHEDULE   Tests on this list are recommended by your physician but may not be covered, or covered at this frequency, by your insurer. Please check with your insurance carrier before scheduling to verify coverage.    PREV patients who meet one of the following criteria:   • Men who are 73-68 years old and have smoked more than 100 cigarettes in their lifetime   • Anyone with a family history    Colorectal Cancer Screening  Covered up to Age 76     Colonoscopy Screen   Cover Influenza  Covered Annually No orders found for this or any previous visit. Please get every year    Pneumococcal 13 (Prevnar)  Covered Once after 65 No orders found for this or any previous visit.  Please get once after your 65th birthday    Pneumococcal 2

## 2019-02-11 NOTE — PROGRESS NOTES
HPI:   Tomeka Fournier is a 79year old female who presents for a MA (Medicare Advantage) Supervisit (Once per calendar year). · Osteoporosis. On Risdronate since [id number](?). Switched over to Alendronate for cost savings. Last DEXA 1/2018. document plan in chart below     Functional Ability/Status   Aparna Vega has some abnormal functions as listed below:  She has no issues with dressing and bathing based on screening of functional status.                              She has [employer] Component Value Date    AST 21 04/25/2017    ALT 27 04/25/2017    CA 9.2 04/25/2017    ALB 4.1 04/25/2017    CREATSERUM 0.96 04/25/2017    GLU 88 04/25/2017        CBC  (most recent labs)   Lab Results   Component Value Date    WBC 8.7 01/11/2016    HGB gums normal  Neck: no adenopathy, no carotid bruit, no JVD, supple, symmetrical, trachea midline and thyroid not enlarged, symmetric, no tenderness/mass/nodules  Lungs: clear to auscultation bilaterally  Heart: S1, S2 normal, no murmur, click, rub or cotton healthy diet, lifestyle, and exercise. F/u in 8/2019. Okay to refill medication until then.     Lesley Ring MD, 2/11/2019     General Health            This section provided for quick review of chart, separate sheet to patient  PREVENTATIVE SERVICES Update Health Maintenance if applicable     Immunizations (Update Immunization Activity if applicable)     Influenza  Covered Annually 9/19/2018 Please get every year    Pneumococcal 13 (Prevnar)  Covered Once after 65 06/18/2015 Please get once after your

## 2019-03-08 ENCOUNTER — TELEPHONE (OUTPATIENT)
Dept: FAMILY MEDICINE CLINIC | Facility: CLINIC | Age: 69
End: 2019-03-08

## 2019-03-08 DIAGNOSIS — I10 ESSENTIAL HYPERTENSION: Primary | ICD-10-CM

## 2019-03-08 RX ORDER — LISINOPRIL AND HYDROCHLOROTHIAZIDE 25; 20 MG/1; MG/1
1 TABLET ORAL DAILY
Qty: 90 TABLET | Refills: 0 | Status: SHIPPED | OUTPATIENT
Start: 2019-03-08 | End: 2019-05-15

## 2019-03-08 NOTE — TELEPHONE ENCOUNTER
Lisinopril hct 20-25mg #90 no refill. Recommend she come in for nurse visit BP check in 3 weeks to be sure BP okay.

## 2019-03-08 NOTE — TELEPHONE ENCOUNTER
Patient dropped off a list of medications that would be covered under her insurance to find a replacement for Losartan.  Please see highlighted medication from list.  Preferred pharmacy [facility]  Any questions contact patient at home number [phone]

## 2019-03-08 NOTE — TELEPHONE ENCOUNTER
Sent medication in to mail order called patient and informed her of new script and need for appointment she will make appointment after she starts taking the medication

## 2019-03-25 DIAGNOSIS — M81.0 AGE-RELATED OSTEOPOROSIS WITHOUT CURRENT PATHOLOGICAL FRACTURE: Primary | ICD-10-CM

## 2019-03-25 DIAGNOSIS — I10 ESSENTIAL HYPERTENSION: ICD-10-CM

## 2019-03-25 DIAGNOSIS — F32.4 MAJOR DEPRESSIVE DISORDER WITH SINGLE EPISODE, IN PARTIAL REMISSION (HCC): ICD-10-CM

## 2019-03-27 RX ORDER — METOPROLOL SUCCINATE 50 MG/1
TABLET, EXTENDED RELEASE ORAL
Qty: 90 TABLET | Refills: 2 | Status: SHIPPED | OUTPATIENT
Start: 2019-03-27 | End: 2019-05-15 | Stop reason: ALTCHOICE

## 2019-03-27 RX ORDER — ALENDRONATE SODIUM 70 MG/1
70 TABLET ORAL WEEKLY
Qty: 12 TABLET | Refills: 2 | Status: SHIPPED | OUTPATIENT
Start: 2019-03-27 | End: 2019-10-31

## 2019-03-27 RX ORDER — CITALOPRAM 40 MG/1
40 TABLET ORAL
Qty: 90 TABLET | Refills: 1 | Status: SHIPPED | OUTPATIENT
Start: 2019-03-27 | End: 2019-10-31

## 2019-03-27 NOTE — TELEPHONE ENCOUNTER
2 meds passed protocol checks refilled per protocol citalopram not on protocol LOV 2/11/2019 with Dr Kyle Kothari

## 2019-05-15 ENCOUNTER — OFFICE VISIT (OUTPATIENT)
Dept: FAMILY MEDICINE CLINIC | Facility: CLINIC | Age: 69
End: 2019-05-15
Payer: MEDICARE

## 2019-05-15 ENCOUNTER — HOSPITAL ENCOUNTER (OUTPATIENT)
Dept: GENERAL RADIOLOGY | Age: 69
Discharge: HOME | End: 2019-05-15
Attending: PHYSICIAN ASSISTANT
Payer: MEDICARE

## 2019-05-15 VITALS
HEART RATE: 72 BPM | RESPIRATION RATE: 16 BRPM | HEIGHT: 62.5 IN | WEIGHT: 169 LBS | TEMPERATURE: 98 F | DIASTOLIC BLOOD PRESSURE: 80 MMHG | BODY MASS INDEX: 30.32 KG/M2 | SYSTOLIC BLOOD PRESSURE: 132 MMHG

## 2019-05-15 DIAGNOSIS — I10 ESSENTIAL HYPERTENSION: Primary | ICD-10-CM

## 2019-05-15 DIAGNOSIS — M53.3 SACROILIAC PAIN: ICD-10-CM

## 2019-05-15 DIAGNOSIS — G89.29 CHRONIC LEFT-SIDED LOW BACK PAIN WITHOUT SCIATICA: ICD-10-CM

## 2019-05-15 DIAGNOSIS — M54.50 CHRONIC LEFT-SIDED LOW BACK PAIN WITHOUT SCIATICA: ICD-10-CM

## 2019-05-15 PROBLEM — N18.30 CKD (CHRONIC KIDNEY DISEASE) STAGE 3, GFR 30-59 ML/MIN (HCC): Chronic | Status: ACTIVE | Noted: 2019-05-15

## 2019-05-15 PROCEDURE — 72202 X-RAY EXAM SI JOINTS 3/> VWS: CPT | Performed by: PHYSICIAN ASSISTANT

## 2019-05-15 PROCEDURE — 99214 OFFICE O/P EST MOD 30 MIN: CPT | Performed by: PHYSICIAN ASSISTANT

## 2019-05-15 RX ORDER — LISINOPRIL AND HYDROCHLOROTHIAZIDE 25; 20 MG/1; MG/1
1 TABLET ORAL DAILY
Qty: 90 TABLET | Refills: 1 | Status: SHIPPED | OUTPATIENT
Start: 2019-05-15 | End: 2019-10-09

## 2019-05-15 NOTE — PATIENT INSTRUCTIONS
Goal is less than 140/90, ideal would be less than 130/90. Watch for dizziness especially when standing and if blood pressures fall below 115/70.

## 2019-05-15 NOTE — PROGRESS NOTES
Patient presents with:  HTN: F/U on HTN and refill lisinopril       HISTORY OF PRESENT ILLNESS  Juan Robles is a 79year old female who presents for hypertension follow up.     - Hypertension: She has been taking lisinopril–hydrochlorothiazide 20- by mouth daily as needed for Anxiety. , Disp: 30 tablet, Rfl: 0  •  Estradiol (ESTRACE) 0.1 MG/GM Vaginal Cream, Use 2gm twice weekly x 2 weeks then 2gm 1-2 times weekly, Disp: 42.5 g, Rfl: 1  •  Co-Enzyme Q-10 30 MG Oral Cap, , Disp: , Rfl:   •  Calcium-Ma that she continue to monitor this at home. Goal less than 140/90. Recommend monitoring diet and exercise. Refilled lisinopril-HCTZ 20-25 mg daily.  Return in 6 mo.    (M53.3) Sacroiliac pain  (M54.5,  G89.29) Chronic left-sided low back pain without sciatic

## 2019-05-22 ENCOUNTER — OFFICE VISIT (OUTPATIENT)
Dept: PHYSICAL THERAPY | Age: 69
End: 2019-05-22
Attending: PHYSICIAN ASSISTANT
Payer: MEDICARE

## 2019-05-22 PROCEDURE — 97161 PT EVAL LOW COMPLEX 20 MIN: CPT

## 2019-05-22 PROCEDURE — 97110 THERAPEUTIC EXERCISES: CPT

## 2019-05-22 NOTE — PROGRESS NOTES
SPINE EVALUATION:   Referring Physician: Dr. Valorie Souza  Diagnosis:  Lumbar spine dysfunction Scoliosis Date of Service: 5/22/2019     PATIENT SUMMARY   Cayetano Harry is a 79year old female who presents to therapy today with complaints of c R.   Abdominal and trunk extensor strength < 3/5     Flexibility:   LE   Hip Flexor: decreased L  Hamstrings: decreased  Piriformis: decreased       Special tests: negative neural tension. Leg length assessment supine difficult due t body habitus.   With AS and return this letter via fax as soon as possible to [phone].  If you have any questions, please contact me at Dept: [phone]    Sincerely,  Electronically signed by therapist: Tio Winston PT    Physician's certification required: Yes  I ce

## 2019-05-29 ENCOUNTER — OFFICE VISIT (OUTPATIENT)
Dept: PHYSICAL THERAPY | Age: 69
End: 2019-05-29
Attending: PHYSICIAN ASSISTANT
Payer: MEDICARE

## 2019-05-29 PROCEDURE — 97112 NEUROMUSCULAR REEDUCATION: CPT

## 2019-05-29 PROCEDURE — 97110 THERAPEUTIC EXERCISES: CPT

## 2019-05-29 NOTE — PROGRESS NOTES
Dx: Lumbar spine dysfunction, scoliosis         Authorized # of Visits:  medicare 8 approved by 7/18/2019. Subjective: Pt reports did her initial HEP as asked. Noticed increased back symptoms daily, has stopped HEP.  Reports she has added ~ 1/8 to pt can perform standing activities such as food preparation with little to no back pain or difficulty. -Decrease excessive L LE loading in stance phase of gait so pt can ambulate in the community with minimal back or difficulty.   -Return to regular exerci

## 2019-06-05 ENCOUNTER — OFFICE VISIT (OUTPATIENT)
Dept: PHYSICAL THERAPY | Age: 69
End: 2019-06-05
Attending: PHYSICIAN ASSISTANT
Payer: MEDICARE

## 2019-06-05 PROCEDURE — 97112 NEUROMUSCULAR REEDUCATION: CPT

## 2019-06-05 NOTE — PROGRESS NOTES
Dx: Lumbar spine dysfunction, scoliosis         Authorized # of Visits:  medicare 8 approved by 7/18/2019. Subjective: Reports good HEP compliancy. Had a full day of \"feeling great\" after last session.  Continues to have L back pain as the day pr compression to lumbar spine. HO issued for additional HEP. Goals:   To be met in 8 sessions    -Improve abdominal strength as noted with biofeedback pressure cuff to 40 mm/hg or > so pt can perform bed and chair transfers with little to no difficulty

## 2019-06-10 ENCOUNTER — OFFICE VISIT (OUTPATIENT)
Dept: PHYSICAL THERAPY | Age: 69
End: 2019-06-10
Attending: PHYSICIAN ASSISTANT
Payer: MEDICARE

## 2019-06-10 PROCEDURE — 97110 THERAPEUTIC EXERCISES: CPT

## 2019-06-10 PROCEDURE — 97112 NEUROMUSCULAR REEDUCATION: CPT

## 2019-06-10 NOTE — PROGRESS NOTES
Dx: Lumbar spine dysfunction, scoliosis         Authorized # of Visits:  medicare 8 approved by 7/18/2019. Subjective: After last tx had 2 consecutive days of of little to no back complaints including walking 3 1/2 miles.  Pain began to return to L pelvis  X 10  (add HEP)  Hook lying:   Ab brace with phase 1 curl up x 10 reps  2 sets (add/progress HEP)       4 pt kneel with        Cat/camel maneuver for lumbar segment and neural self mobilizations  X 10  (add HEP)  R side:  Soft roll under R IC, corre min  Total Treatment Time:  45 min

## 2019-06-19 ENCOUNTER — OFFICE VISIT (OUTPATIENT)
Dept: PHYSICAL THERAPY | Age: 69
End: 2019-06-19
Attending: PHYSICIAN ASSISTANT
Payer: MEDICARE

## 2019-06-19 PROCEDURE — 97112 NEUROMUSCULAR REEDUCATION: CPT

## 2019-06-19 PROCEDURE — 97110 THERAPEUTIC EXERCISES: CPT

## 2019-06-19 NOTE — PROGRESS NOTES
Dx: Lumbar spine dysfunction, scoliosis         Authorized # of Visits:  medicare 8 approved by 7/18/2019. Subjective:  Pt reports there has been a noticeable consistent improvement with back pain with all transfers. No back pain with stairs.   Has lying: Ab brace with phase 1 curl up x 10 reps  3 sets (progress HEP)      With bent knee raises (marching x 10 each  (add HEP) Hook lying:   Ab brace with bridge  X 10  -add 3 sec holds  (cont HEP) As above with bent knee fallouts and bent knee raise  (wi little to no back pain or difficulty. In progress  -Decrease excessive L LE loading in stance phase of gait so pt can ambulate in the community with minimal back or difficulty. In progress  -Return to regular exercise at her fitness center/gym.  In progress

## 2019-06-24 ENCOUNTER — OFFICE VISIT (OUTPATIENT)
Dept: PHYSICAL THERAPY | Age: 69
End: 2019-06-24
Attending: PHYSICIAN ASSISTANT
Payer: MEDICARE

## 2019-06-24 PROCEDURE — 97112 NEUROMUSCULAR REEDUCATION: CPT

## 2019-06-24 NOTE — PROGRESS NOTES
Dx: Lumbar spine dysfunction, scoliosis         Authorized # of Visits:  medicare 8 approved by 7/18/2019. Subjective: Has had little to no L back or hip pain since last seen. Jacky Jackson as asked. Some R gluteal soreness, otherwise n/c with HEP. and neural self mobilizations  X 10  (cont HEP) NM re ed:  Hook lying:  Use biofeedback presure cuff  - with draw in maneuver  50 mm/hg Hook lying: Ab brace with phase 1 curl up x 10 reps  3 sets (progress HEP) Prone:   Alternate hip ext SLR, progress to f 4 met.         Goals: To be met in 8 sessions    -Improve abdominal strength as noted with biofeedback pressure cuff to 40 mm/hg or > so pt can perform bed and chair transfers with little to no difficulty or back pain.  Met  -Improve back extension strengt

## 2019-07-03 ENCOUNTER — OFFICE VISIT (OUTPATIENT)
Dept: PHYSICAL THERAPY | Age: 69
End: 2019-07-03
Attending: PHYSICIAN ASSISTANT
Payer: MEDICARE

## 2019-07-03 PROCEDURE — 97112 NEUROMUSCULAR REEDUCATION: CPT

## 2019-07-03 PROCEDURE — 97140 MANUAL THERAPY 1/> REGIONS: CPT

## 2019-07-03 PROCEDURE — 97110 THERAPEUTIC EXERCISES: CPT

## 2019-07-03 NOTE — PROGRESS NOTES
Dx: Lumbar spine dysfunction, scoliosis         Authorized # of Visits:  medicare 8 approved by 7/18/2019. Subjective: Overall doing well. Original c/o L back and hip pain appears resolved. None present with any current actity recently.   Has some wall   X 10 2 sets  (add HEP) NM re ed: 35 min  Back to wall in L side lying:  -add ab brace  -cues for neutral spine/hips  -add R hip ABD slides into wall   X 10 3 sets  (cont/progress HEP) R side:  Soft roll under R IC, correct scoliosis, hip shift  X 2 Stand:  Lateral walk out (multifidus)  -v/c t/c for neutral spine and pelvis  Use 7.5 wt with cable machine  X 10 each side    Again with blue band, progress to 2-3 step s oput each side  X 10 each side  (new HEP)      4 pt kneel with        Cat/camel ross L LE loading in stance phase of gait so pt can ambulate in the community with minimal back or difficulty. In progress  -Return to regular exercise at her fitness center/gym.  Met  -Little to no difficulty amb stairs reciprocally, 1 rail assist.  Met  -Indep

## 2019-07-10 ENCOUNTER — OFFICE VISIT (OUTPATIENT)
Dept: PHYSICAL THERAPY | Age: 69
End: 2019-07-10
Attending: PHYSICIAN ASSISTANT
Payer: MEDICARE

## 2019-07-10 PROCEDURE — 97140 MANUAL THERAPY 1/> REGIONS: CPT

## 2019-07-10 PROCEDURE — 97110 THERAPEUTIC EXERCISES: CPT

## 2019-07-10 PROCEDURE — 97112 NEUROMUSCULAR REEDUCATION: CPT

## 2019-07-10 NOTE — PROGRESS NOTES
Dx: Lumbar spine dysfunction, scoliosis         Authorized # of Visits:  medicare 8 approved by 7/18/2019. Physical Therapy Discharge Report  8 of 8 sessions completed    Subjective: Overall doing well.  Has little to no symptoms with any current ac rail assist.  Met  -Independent in progressive HEP. Met    Plan: .  D/c therapy     Charges:   nm re ed 2 there x 1 man 1     Total Timed Treatment:  55 min  Total Treatment Time:  55 min        Date: 5/29/2019 TX#: 2/ Date:      6/6         TX#: 3/   Date: re ed: 35 min  Back to wall in L side lying:  -add ab brace  -cues for neutral spine/hips  -add R hip ABD slides into wall   X 10 3 sets  (cont/progress HEP) R side:  Soft roll under R IC, correct scoliosis, hip shift  X 2 min   (cont HEP) NM re ed: 5 min HEP)  Hook lying:   Ab brace with phase 1 curl up x 10 reps  2 sets (add/progress HEP) Stand:  Side to wall, L LE against wall, push with slide up and against,  Maintain neutral pelvis  -v/c t/c for hip symmetry (move hips to L and maintain)  X 10  -again a

## 2019-07-17 ENCOUNTER — APPOINTMENT (OUTPATIENT)
Dept: PHYSICAL THERAPY | Age: 69
End: 2019-07-17
Attending: PHYSICIAN ASSISTANT
Payer: MEDICARE

## 2019-07-21 NOTE — TELEPHONE ENCOUNTER
Fax received from THE [facility] stating that they received Rx for Citalopram 40 mg daily. The maximum recommended dosage for geriatric patients is Citalopram 20 mg daily. [facility] is asking for clarification of the dosing which is over the maximum dosing.
From Dr Kelly Levine. Kennedy Schumacher has been stable on the 40 mg since January 2016. Recommend filling at that dose.
Response faxed back to THE [facility].
WDL
WDL

## 2019-07-24 ENCOUNTER — APPOINTMENT (OUTPATIENT)
Dept: PHYSICAL THERAPY | Age: 69
End: 2019-07-24
Attending: PHYSICIAN ASSISTANT
Payer: MEDICARE

## 2019-09-18 DIAGNOSIS — F32.4 MAJOR DEPRESSIVE DISORDER WITH SINGLE EPISODE, IN PARTIAL REMISSION (HCC): ICD-10-CM

## 2019-09-18 NOTE — TELEPHONE ENCOUNTER
LOV 5/15/2019     Patient was asked to follow-up in: 8/2019    Appointment scheduled: Visit date not found     Refill request for:    Requested Prescriptions     Pending Prescriptions Disp Refills   • CITALOPRAM HYDROBROMIDE 40 MG Oral Tab [Pharmacy Med Na

## 2019-09-19 NOTE — TELEPHONE ENCOUNTER
Patient scheduled appointment for 10/31 with Dr Kamari Church, states does have enough medication to get to appointment

## 2019-09-20 RX ORDER — CITALOPRAM 40 MG/1
TABLET ORAL
Qty: 90 TABLET | Refills: 1 | OUTPATIENT
Start: 2019-09-20

## 2019-09-27 DIAGNOSIS — N95.2 VAGINAL ATROPHY: Primary | ICD-10-CM

## 2019-10-02 RX ORDER — ESTRADIOL 0.1 MG/G
CREAM VAGINAL
Qty: 42.5 G | Refills: 0 | Status: SHIPPED | OUTPATIENT
Start: 2019-10-02 | End: 2020-06-04

## 2019-10-02 NOTE — TELEPHONE ENCOUNTER
LOV 5/15/19, next appt 10/31/19, last pap 1/18/16, last mammogram 8/7/18.  Pt will be reminded at upcoming appt regarding mammo  Refilled Estrace per protocol

## 2019-10-07 ENCOUNTER — OFFICE VISIT (OUTPATIENT)
Dept: FAMILY MEDICINE CLINIC | Facility: CLINIC | Age: 69
End: 2019-10-07
Payer: MEDICARE

## 2019-10-07 ENCOUNTER — TELEPHONE (OUTPATIENT)
Dept: FAMILY MEDICINE CLINIC | Facility: CLINIC | Age: 69
End: 2019-10-07

## 2019-10-07 VITALS
HEART RATE: 96 BPM | RESPIRATION RATE: 16 BRPM | TEMPERATURE: 100 F | DIASTOLIC BLOOD PRESSURE: 70 MMHG | SYSTOLIC BLOOD PRESSURE: 118 MMHG | HEIGHT: 62.5 IN | WEIGHT: 168.63 LBS | BODY MASS INDEX: 30.26 KG/M2

## 2019-10-07 DIAGNOSIS — J06.9 ACUTE URI: Primary | ICD-10-CM

## 2019-10-07 PROCEDURE — 99213 OFFICE O/P EST LOW 20 MIN: CPT | Performed by: NURSE PRACTITIONER

## 2019-10-07 NOTE — TELEPHONE ENCOUNTER
Called and talked to patient told she needs to be seen today made an appointment with Kyle Esposito FNP for today at 1:30

## 2019-10-07 NOTE — PROGRESS NOTES
Patient presents with:  Cough: Cough, wheezing, vomited 1x for 1 day. HPI:  Presents with 1 day history of chills (did not check temperature), wheezing, occasional cough without production and one time episode of emesis at approx 2AM this morning.  Dave Child breakfast and 1 tab at dinner Disp:  Rfl:    Cholecalciferol 5000 units Oral Tab Take 1 tablet by mouth daily. Disp:  Rfl:    MAGNESIUM GLYCINATE PLUS OR Take 120 mg by mouth.  Takes 1 tab at breakfast and 3 tabs before bed Disp:  Rfl:    Strontium Chloride patient instructions, routine nasal saline sinus rinses, warm salt water gargles, as per patient instructions, along with supportive care-increased fluids/rest. Instructed to notify office if not improved in 3-4 days or if symptoms worsen.  Verbalized under

## 2019-10-07 NOTE — TELEPHONE ENCOUNTER
Last night she was cold and threw up.  Hears rumbling/ wheezing in her chest and has concerns as she has had Pneumonia in the past.. requesting to speak to a nurse first

## 2019-10-07 NOTE — PATIENT INSTRUCTIONS
Gargle with warm salt water solution 3-5 times daily. Dissolve 1/2 teaspoon salt in half cup of warm tap water. Gargle and spit.      Try a premixed saline nasal spray, available over the counter, such as [place] Nasal Spray (or generic equi

## 2019-10-09 ENCOUNTER — TELEPHONE (OUTPATIENT)
Dept: FAMILY MEDICINE CLINIC | Facility: CLINIC | Age: 69
End: 2019-10-09

## 2019-10-09 RX ORDER — AMOXICILLIN AND CLAVULANATE POTASSIUM 875; 125 MG/1; MG/1
1 TABLET, FILM COATED ORAL 2 TIMES DAILY
Qty: 20 TABLET | Refills: 0 | Status: SHIPPED | OUTPATIENT
Start: 2019-10-09 | End: 2019-10-19

## 2019-10-09 NOTE — TELEPHONE ENCOUNTER
Patient calling states is not getting better, was in office on 10/07 and is starting to have with lots of yellow phlegm, wants to know if can get something else called in to pharmacy?

## 2019-10-09 NOTE — TELEPHONE ENCOUNTER
Called and talked to patient she is not getting better now coughing up yellow phlegm.  If something sent in wants to use the [facility] on [id number] [facility], [address] [place] [address]

## 2019-10-09 NOTE — TELEPHONE ENCOUNTER
Prescription for Augmentin sent to requested pharmacy, 1 tab VID for 10 days. Take all tabs and continue saline sinus rinses. Instructed to notify office if not improved in 4-5 days or if symptoms worsen. Thanks.

## 2019-10-09 NOTE — TELEPHONE ENCOUNTER
called patient and informed her of new prescription she will get this filled and let us know if not getting better

## 2019-10-18 ENCOUNTER — TELEPHONE (OUTPATIENT)
Dept: FAMILY MEDICINE CLINIC | Facility: CLINIC | Age: 69
End: 2019-10-18

## 2019-10-18 NOTE — TELEPHONE ENCOUNTER
Patient called states finished the amoxicillin and is still not feeling better, is coughing up yellow and green phlegm, wants to know if can call something else in or would have to come in? no peripheral edema/no palpitations

## 2019-10-18 NOTE — TELEPHONE ENCOUNTER
Called patient, C/O coughing up yellow phlegm, which causes a \"coughing fit\" and throat irritation. completed course of Augmentin x 10 days  Questioned if she should be on another course of antibiotic?     Explained that while uncomfortable, the coughing i

## 2019-10-21 NOTE — TELEPHONE ENCOUNTER
Agree with response from Cinthia Riggins. If cough is still bothersome can return to office for further eval. Thanks.

## 2019-10-31 ENCOUNTER — OFFICE VISIT (OUTPATIENT)
Dept: FAMILY MEDICINE CLINIC | Facility: CLINIC | Age: 69
End: 2019-10-31
Payer: MEDICARE

## 2019-10-31 VITALS
RESPIRATION RATE: 16 BRPM | SYSTOLIC BLOOD PRESSURE: 110 MMHG | TEMPERATURE: 98 F | WEIGHT: 166 LBS | HEIGHT: 63.5 IN | BODY MASS INDEX: 29.05 KG/M2 | DIASTOLIC BLOOD PRESSURE: 60 MMHG | HEART RATE: 84 BPM

## 2019-10-31 DIAGNOSIS — R73.9 HYPERGLYCEMIA: Primary | ICD-10-CM

## 2019-10-31 DIAGNOSIS — N18.30 CKD (CHRONIC KIDNEY DISEASE) STAGE 3, GFR 30-59 ML/MIN (HCC): ICD-10-CM

## 2019-10-31 DIAGNOSIS — I10 ESSENTIAL HYPERTENSION: ICD-10-CM

## 2019-10-31 DIAGNOSIS — M81.0 AGE-RELATED OSTEOPOROSIS WITHOUT CURRENT PATHOLOGICAL FRACTURE: ICD-10-CM

## 2019-10-31 DIAGNOSIS — E78.00 PURE HYPERCHOLESTEROLEMIA: ICD-10-CM

## 2019-10-31 DIAGNOSIS — Z12.31 VISIT FOR SCREENING MAMMOGRAM: ICD-10-CM

## 2019-10-31 DIAGNOSIS — E55.9 VITAMIN D DEFICIENCY: ICD-10-CM

## 2019-10-31 DIAGNOSIS — F32.4 MAJOR DEPRESSIVE DISORDER WITH SINGLE EPISODE, IN PARTIAL REMISSION (HCC): ICD-10-CM

## 2019-10-31 PROCEDURE — 83036 HEMOGLOBIN GLYCOSYLATED A1C: CPT | Performed by: FAMILY MEDICINE

## 2019-10-31 PROCEDURE — 99214 OFFICE O/P EST MOD 30 MIN: CPT | Performed by: FAMILY MEDICINE

## 2019-10-31 RX ORDER — CITALOPRAM 40 MG/1
40 TABLET ORAL
Qty: 90 TABLET | Refills: 3 | Status: SHIPPED | OUTPATIENT
Start: 2019-10-31 | End: 2020-09-01

## 2019-10-31 RX ORDER — METOPROLOL SUCCINATE 50 MG/1
TABLET, EXTENDED RELEASE ORAL
COMMUNITY
Start: 2019-09-16 | End: 2019-10-31

## 2019-10-31 RX ORDER — LISINOPRIL AND HYDROCHLOROTHIAZIDE 25; 20 MG/1; MG/1
1 TABLET ORAL DAILY
Qty: 90 TABLET | Refills: 3 | Status: SHIPPED | OUTPATIENT
Start: 2019-10-31 | End: 2020-09-01

## 2019-10-31 RX ORDER — ALENDRONATE SODIUM 70 MG/1
70 TABLET ORAL WEEKLY
Qty: 12 TABLET | Refills: 3 | Status: SHIPPED | OUTPATIENT
Start: 2019-10-31 | End: 2020-09-01

## 2019-10-31 RX ORDER — METOPROLOL SUCCINATE 50 MG/1
50 TABLET, EXTENDED RELEASE ORAL DAILY
Qty: 90 TABLET | Refills: 3 | Status: SHIPPED | OUTPATIENT
Start: 2019-10-31 | End: 2020-09-01

## 2019-10-31 NOTE — PROGRESS NOTES
Nic Beckett is a 81year old female. HPI:   Patient presents for a medication follow up. Osteoporosis. On Risdronate since [id number](?). Switched over to Alendronate for cost savings. Last DEXA 1/2018.   She takes calcium/mag and Vit D shortness of breath  CARDIOVASCULAR: denies chest pain, denies palpitation, denies pedal edema  GI: denies abdominal pain  NEURO: denies headaches    EXAM:   /60   Pulse 84   Temp 98.1 °F (36.7 °C) (Oral)   Resp 16   Ht 63.5\"   Wt 166 lb (75.3 kg) Prescriptions Disp Refills   • alendronate 70 MG Oral Tab 12 tablet 3     Sig: Take 1 tablet (70 mg total) by mouth once a week. Take in the morning and 30 minutes before eating or drinking take with plain water.    • Citalopram Hydrobromide 40 MG Oral Tab

## 2020-03-02 ENCOUNTER — OFFICE VISIT (OUTPATIENT)
Dept: FAMILY MEDICINE CLINIC | Facility: CLINIC | Age: 70
End: 2020-03-02
Payer: MEDICARE

## 2020-03-02 VITALS
TEMPERATURE: 98 F | HEART RATE: 60 BPM | DIASTOLIC BLOOD PRESSURE: 60 MMHG | WEIGHT: 173 LBS | HEIGHT: 63.5 IN | BODY MASS INDEX: 30.27 KG/M2 | RESPIRATION RATE: 20 BRPM | SYSTOLIC BLOOD PRESSURE: 104 MMHG

## 2020-03-02 DIAGNOSIS — Z00.00 ENCOUNTER FOR ANNUAL HEALTH EXAMINATION: Primary | ICD-10-CM

## 2020-03-02 DIAGNOSIS — I10 ESSENTIAL HYPERTENSION: ICD-10-CM

## 2020-03-02 DIAGNOSIS — E55.9 VITAMIN D DEFICIENCY: ICD-10-CM

## 2020-03-02 DIAGNOSIS — R73.9 HYPERGLYCEMIA: ICD-10-CM

## 2020-03-02 DIAGNOSIS — M81.0 AGE-RELATED OSTEOPOROSIS WITHOUT CURRENT PATHOLOGICAL FRACTURE: ICD-10-CM

## 2020-03-02 DIAGNOSIS — K64.8 INTERNAL HEMORRHOIDS: ICD-10-CM

## 2020-03-02 DIAGNOSIS — J47.9 BRONCHIECTASIS WITHOUT COMPLICATION (HCC): ICD-10-CM

## 2020-03-02 DIAGNOSIS — F32.4 MAJOR DEPRESSIVE DISORDER WITH SINGLE EPISODE, IN PARTIAL REMISSION (HCC): ICD-10-CM

## 2020-03-02 DIAGNOSIS — Z86.39: ICD-10-CM

## 2020-03-02 DIAGNOSIS — K57.90 DIVERTICULOSIS OF INTESTINE WITHOUT BLEEDING, UNSPECIFIED INTESTINAL TRACT LOCATION: ICD-10-CM

## 2020-03-02 DIAGNOSIS — N18.30 CKD (CHRONIC KIDNEY DISEASE) STAGE 3, GFR 30-59 ML/MIN (HCC): Chronic | ICD-10-CM

## 2020-03-02 DIAGNOSIS — K63.5 POLYP OF COLON, UNSPECIFIED PART OF COLON, UNSPECIFIED TYPE: ICD-10-CM

## 2020-03-02 DIAGNOSIS — E78.00 PURE HYPERCHOLESTEROLEMIA: ICD-10-CM

## 2020-03-02 DIAGNOSIS — G47.33 OSA (OBSTRUCTIVE SLEEP APNEA): ICD-10-CM

## 2020-03-02 PROCEDURE — 99397 PER PM REEVAL EST PAT 65+ YR: CPT | Performed by: FAMILY MEDICINE

## 2020-03-02 PROCEDURE — G0439 PPPS, SUBSEQ VISIT: HCPCS | Performed by: FAMILY MEDICINE

## 2020-03-02 PROCEDURE — 96160 PT-FOCUSED HLTH RISK ASSMT: CPT | Performed by: FAMILY MEDICINE

## 2020-03-02 NOTE — PATIENT INSTRUCTIONS
Please call [phone] to schedule mammogram and bone density at [facility]. Please have your labs done when you have fasting 10-12 hours.      ISAAC Gil 19 SCREENING SCHEDULE   Tests on this list are recommended by your physician but may not be IPPE only No results found for this or any previous visit.  Limited to patients who meet one of the following criteria:   • Men who are 73-68 years old and have smoked more than 100 cigarettes in their lifetime   • Anyone with a family history    Colorectal 08/07/2019 Please get this Mammogram regularly   Immunizations      Influenza  Covered Annually No orders found for this or any previous visit.  Please get every year    Pneumococcal 13 (Prevnar)  Covered Once after 65 No orders found for this or any previo Advance Directives.

## 2020-03-02 NOTE — PROGRESS NOTES
HPI:   Jose L Armstrong is a 81year old female who presents for a MA (Medicare Advantage) Supervisit (Once per calendar year). Osteoporosis. On Risdronate since [id number](?). Switched over to Alendronate for cost savings. Last DEXA 1/2018.   Oliver Weaver discussion of advance directives standard forms performed Face to Face with patient and Family/surrogate (if present), and forms available to patient in AVS         She has never smoked tobacco.    CAGE Alcohol screening   Manjit durant once daily. Metoprolol Succinate ER 50 MG Oral Tablet 24 Hr, Take 1 tablet (50 mg total) by mouth daily. Lisinopril-hydroCHLOROthiazide 20-25 MG Oral Tab, Take 1 tablet by mouth daily.   Estradiol (ESTRACE) 0.1 MG/GM Vaginal Cream, Use 2gm twice weekly x dysuria  EXT:  No joint pain.   No LE swelling  PSYCH:  No mood concerns or anxiety      EXAM:   /60 (BP Location: Left arm)   Pulse 60   Temp 97.6 °F (36.4 °C) (Oral)   Resp 20   Ht 63.5\"   Wt 173 lb (78.5 kg)   BMI 30.16 kg/m²  Estimated body mass health examination  Will consider shingrix    2. CKD (chronic kidney disease) stage 3, GFR 30-59 ml/min ([facility])  monitor    3. Bronchiectasis without complication ([facility] [address] - [place] [address]  No sx    4.  Diverticulosis of intestine without bleeding, unspecified intestin 06/23/2015 104 milligrams per deciliter (H)          Cardiovascular Disease Screening     LDL Annually LDL Cholesterol Calc (milligrams per deciliter)   Date Value   02/11/2013 151 (H)     LDL Cholesterol (mg/dL)   Date Value   02/11/2019 117 (H) concentrates   Clients of institutions for the mentally retarded   Persons who live in the same house as a HepB virus carrier   Homosexual men   Illicit injectable drug abusers     Tetanus Toxoid  Only covered with a cut with metal- TD and TDaP Not covered

## 2020-06-03 DIAGNOSIS — N95.2 VAGINAL ATROPHY: ICD-10-CM

## 2020-06-03 NOTE — TELEPHONE ENCOUNTER
Fax received from [facility] requesting a refill of Estradiol. Request fails protocol because last mammogram was completed 8/1/2018. There is a pending mammogram ordered. Called pt and she agrees to get mammogram done.     LOV 10/31/2020 and at that time,

## 2020-06-04 RX ORDER — ESTRADIOL 0.1 MG/G
CREAM VAGINAL
Qty: 42.5 G | Refills: 0 | Status: SHIPPED | OUTPATIENT
Start: 2020-06-04 | End: 2021-08-27

## 2020-06-10 ENCOUNTER — HOSPITAL ENCOUNTER (OUTPATIENT)
Dept: MAMMOGRAPHY | Age: 70
Discharge: HOME | End: 2020-06-10
Attending: FAMILY MEDICINE
Payer: MEDICARE

## 2020-06-10 ENCOUNTER — HOSPITAL ENCOUNTER (OUTPATIENT)
Dept: BONE DENSITY | Age: 70
Discharge: HOME | End: 2020-06-10
Attending: FAMILY MEDICINE
Payer: MEDICARE

## 2020-06-10 DIAGNOSIS — M81.0 AGE-RELATED OSTEOPOROSIS WITHOUT CURRENT PATHOLOGICAL FRACTURE: ICD-10-CM

## 2020-06-10 DIAGNOSIS — Z12.31 VISIT FOR SCREENING MAMMOGRAM: ICD-10-CM

## 2020-06-10 PROCEDURE — 77063 BREAST TOMOSYNTHESIS BI: CPT | Performed by: FAMILY MEDICINE

## 2020-06-10 PROCEDURE — 77080 DXA BONE DENSITY AXIAL: CPT | Performed by: FAMILY MEDICINE

## 2020-06-10 PROCEDURE — 77067 SCR MAMMO BI INCL CAD: CPT | Performed by: FAMILY MEDICINE

## 2020-06-15 ENCOUNTER — HOSPITAL ENCOUNTER (OUTPATIENT)
Dept: MAMMOGRAPHY | Facility: HOSPITAL | Age: 70
Discharge: HOME | End: 2020-06-15
Attending: FAMILY MEDICINE
Payer: MEDICARE

## 2020-06-15 DIAGNOSIS — R92.2 INCONCLUSIVE MAMMOGRAM: ICD-10-CM

## 2020-06-15 PROCEDURE — 77066 DX MAMMO INCL CAD BI: CPT | Performed by: FAMILY MEDICINE

## 2020-06-15 PROCEDURE — 77062 BREAST TOMOSYNTHESIS BI: CPT | Performed by: FAMILY MEDICINE

## 2020-06-15 PROCEDURE — 76642 ULTRASOUND BREAST LIMITED: CPT | Performed by: FAMILY MEDICINE

## 2020-06-16 ENCOUNTER — TELEPHONE (OUTPATIENT)
Dept: FAMILY MEDICINE CLINIC | Facility: CLINIC | Age: 70
End: 2020-06-16

## 2020-06-16 DIAGNOSIS — R92.8 ABNORMAL MAMMOGRAM OF LEFT BREAST: Primary | ICD-10-CM

## 2020-06-16 NOTE — TELEPHONE ENCOUNTER
Patient called wants to notify Dr Kyle Kothari she is declining to schedule the MRI, states there is family history with dense breasts and believes may be hereditary. If any questions can give her a call.

## 2020-06-16 NOTE — TELEPHONE ENCOUNTER
Patient is calling back has questions regarding her US results weather a lump was found and is the MRI really needed?  Patient had requested to speak to Dr. Franky Stone to ask questions to let her know the reason why she did not go forward with the MRI

## 2020-06-18 NOTE — TELEPHONE ENCOUNTER
Called patient LMOM in detail that order placed and needs to wait for insurance approval. And some one will call her when and if it gets approved.

## 2020-06-26 ENCOUNTER — HOSPITAL ENCOUNTER (OUTPATIENT)
Dept: MRI IMAGING | Facility: HOSPITAL | Age: 70
Discharge: HOME | End: 2020-06-26
Attending: FAMILY MEDICINE
Payer: MEDICARE

## 2020-06-26 DIAGNOSIS — R92.8 ABNORMAL MAMMOGRAM OF LEFT BREAST: ICD-10-CM

## 2020-06-26 PROCEDURE — 77048 MRI BREAST C-+ W/CAD UNI: CPT | Performed by: FAMILY MEDICINE

## 2020-06-26 PROCEDURE — A9575 INJ GADOTERATE MEGLUMI 0.1ML: HCPCS | Performed by: FAMILY MEDICINE

## 2020-06-26 PROCEDURE — 82565 ASSAY OF CREATININE: CPT

## 2020-06-30 ENCOUNTER — TELEPHONE (OUTPATIENT)
Dept: FAMILY MEDICINE CLINIC | Facility: CLINIC | Age: 70
End: 2020-06-30

## 2020-06-30 ENCOUNTER — TELEPHONE (OUTPATIENT)
Dept: CT IMAGING | Facility: HOSPITAL | Age: 70
End: 2020-06-30

## 2020-06-30 NOTE — TELEPHONE ENCOUNTER
Patient called requesting to speak with nurse, has questions regarding her MRI results and was told needs to get a biopsy done due to her mammogram results. Wants to consult with Dr Caroline Medellin if this is something okay to go forward with.

## 2020-06-30 NOTE — TELEPHONE ENCOUNTER
This Breast Care RN phoned pt re MRI bx biopsy recommendation by Dr. Shellie Romano for enhancement that correlates with mammogram.  Procedure reviewed and all questions answered. Emotional support given. Confirmed pt did receive educational handouts via [address]

## 2020-06-30 NOTE — TELEPHONE ENCOUNTER
Received fax from THE [facility] (Breast Core Biopsy order) requesting 's signature and faxed back to [phone].  Form in Dr Byrd Windom

## 2020-06-30 NOTE — TELEPHONE ENCOUNTER
Patient calling as a f/u to her recent breast MRI. She reports that she has no family hx of breast CA and a strong family hx of breast fibroids. Because of this, she is inclined to watch the area of concern vs having a bx right away.  She asks whether Dr. Pal Dates

## 2020-07-06 DIAGNOSIS — R92.8 ABNORMAL MAMMOGRAM OF LEFT BREAST: Primary | ICD-10-CM

## 2020-07-06 NOTE — PROGRESS NOTES
Results reviewed with patient per phone. Patient verbalized understanding. Pt is  declining MRI bx because 1. She has no family Hx of breast cancer. 2. Type 1 pattern. 3.It shows low suspicion for malignancy.

## 2020-07-07 NOTE — TELEPHONE ENCOUNTER
The only way we can be certain that this is benign is to do the biopsy. I recommend she proceed with the biopsy.

## 2020-07-10 NOTE — TELEPHONE ENCOUNTER
TC to patient to let her know that biopsy is approved. Patient states at this time she does not wish to proceed with biopsy and would prefer to just keep an eye on it. Forwarding this information to you just to keep you updated.

## 2020-09-01 ENCOUNTER — OFFICE VISIT (OUTPATIENT)
Dept: FAMILY MEDICINE CLINIC | Facility: CLINIC | Age: 70
End: 2020-09-01
Payer: MEDICARE

## 2020-09-01 VITALS
BODY MASS INDEX: 31.4 KG/M2 | WEIGHT: 175 LBS | SYSTOLIC BLOOD PRESSURE: 136 MMHG | HEART RATE: 60 BPM | DIASTOLIC BLOOD PRESSURE: 86 MMHG | RESPIRATION RATE: 16 BRPM | HEIGHT: 62.5 IN | TEMPERATURE: 98 F

## 2020-09-01 DIAGNOSIS — F32.4 MAJOR DEPRESSIVE DISORDER WITH SINGLE EPISODE, IN PARTIAL REMISSION (HCC): ICD-10-CM

## 2020-09-01 DIAGNOSIS — M81.0 AGE-RELATED OSTEOPOROSIS WITHOUT CURRENT PATHOLOGICAL FRACTURE: ICD-10-CM

## 2020-09-01 DIAGNOSIS — I10 ESSENTIAL HYPERTENSION: Primary | ICD-10-CM

## 2020-09-01 DIAGNOSIS — R92.8 ABNORMAL MAMMOGRAM: ICD-10-CM

## 2020-09-01 PROCEDURE — 99214 OFFICE O/P EST MOD 30 MIN: CPT | Performed by: FAMILY MEDICINE

## 2020-09-01 PROCEDURE — 3075F SYST BP GE 130 - 139MM HG: CPT | Performed by: FAMILY MEDICINE

## 2020-09-01 PROCEDURE — 3079F DIAST BP 80-89 MM HG: CPT | Performed by: FAMILY MEDICINE

## 2020-09-01 PROCEDURE — 3008F BODY MASS INDEX DOCD: CPT | Performed by: FAMILY MEDICINE

## 2020-09-01 RX ORDER — LISINOPRIL AND HYDROCHLOROTHIAZIDE 25; 20 MG/1; MG/1
1 TABLET ORAL DAILY
Qty: 90 TABLET | Refills: 3 | Status: SHIPPED | OUTPATIENT
Start: 2020-09-01 | End: 2021-06-02

## 2020-09-01 RX ORDER — ALENDRONATE SODIUM 70 MG/1
70 TABLET ORAL WEEKLY
Qty: 12 TABLET | Refills: 3 | Status: SHIPPED | OUTPATIENT
Start: 2020-09-01 | End: 2021-06-22

## 2020-09-01 RX ORDER — METOPROLOL SUCCINATE 50 MG/1
50 TABLET, EXTENDED RELEASE ORAL DAILY
Qty: 90 TABLET | Refills: 3 | Status: SHIPPED | OUTPATIENT
Start: 2020-09-01 | End: 2021-06-02

## 2020-09-01 RX ORDER — CITALOPRAM 40 MG/1
40 TABLET ORAL
Qty: 90 TABLET | Refills: 3 | Status: SHIPPED | OUTPATIENT
Start: 2020-09-01 | End: 2021-06-02

## 2020-09-01 NOTE — PROGRESS NOTES
Marvin Martínez is a 78year old female. HPI:   Patient presents for a medication follow up. Breast biopsy recommended based off MRI, although low suspicious for malignancy. Pt does not wish to proceed with biopsy. Osteoporosis.   On Risdr (76.7 kg)  02/11/19 : 168 lb (76.2 kg)    Body mass index is 31.5 kg/m².     REVIEW OF SYSTEMS:   GENERAL HEALTH: feels well otherwise  RESPIRATORY: denies shortness of breath  CARDIOVASCULAR: denies chest pain, denies palpitation, denies pedal edema  GI: d

## 2020-10-22 ENCOUNTER — LAB ENCOUNTER (OUTPATIENT)
Dept: LAB | Age: 70
End: 2020-10-22
Attending: FAMILY MEDICINE
Payer: MEDICARE

## 2020-10-22 DIAGNOSIS — R73.9 HYPERGLYCEMIA: ICD-10-CM

## 2020-10-22 DIAGNOSIS — M81.0 AGE-RELATED OSTEOPOROSIS WITHOUT CURRENT PATHOLOGICAL FRACTURE: ICD-10-CM

## 2020-10-22 DIAGNOSIS — E78.00 PURE HYPERCHOLESTEROLEMIA: ICD-10-CM

## 2020-10-22 DIAGNOSIS — E55.9 VITAMIN D DEFICIENCY: ICD-10-CM

## 2020-10-22 PROCEDURE — 80061 LIPID PANEL: CPT

## 2020-10-22 PROCEDURE — 83036 HEMOGLOBIN GLYCOSYLATED A1C: CPT

## 2020-10-22 PROCEDURE — 80053 COMPREHEN METABOLIC PANEL: CPT

## 2020-10-22 PROCEDURE — 36415 COLL VENOUS BLD VENIPUNCTURE: CPT

## 2020-10-22 PROCEDURE — 82306 VITAMIN D 25 HYDROXY: CPT

## 2021-02-25 ENCOUNTER — TELEPHONE (OUTPATIENT)
Dept: FAMILY MEDICINE CLINIC | Facility: CLINIC | Age: 71
End: 2021-02-25

## 2021-02-26 ENCOUNTER — MA CHART PREP (OUTPATIENT)
Dept: FAMILY MEDICINE CLINIC | Facility: CLINIC | Age: 71
End: 2021-02-26

## 2021-02-26 PROBLEM — E66.9 OBESITY (BMI 30.0-34.9): Status: ACTIVE | Noted: 2021-02-26

## 2021-02-26 PROBLEM — E66.811 OBESITY (BMI 30.0-34.9): Status: ACTIVE | Noted: 2021-02-26

## 2021-03-01 ENCOUNTER — OFFICE VISIT (OUTPATIENT)
Dept: FAMILY MEDICINE CLINIC | Facility: CLINIC | Age: 71
End: 2021-03-01
Payer: MEDICARE

## 2021-03-01 VITALS
HEIGHT: 63 IN | HEART RATE: 67 BPM | WEIGHT: 168 LBS | RESPIRATION RATE: 18 BRPM | SYSTOLIC BLOOD PRESSURE: 104 MMHG | DIASTOLIC BLOOD PRESSURE: 64 MMHG | BODY MASS INDEX: 29.77 KG/M2 | TEMPERATURE: 98 F

## 2021-03-01 DIAGNOSIS — G47.33 OSA (OBSTRUCTIVE SLEEP APNEA): ICD-10-CM

## 2021-03-01 DIAGNOSIS — I10 ESSENTIAL HYPERTENSION: ICD-10-CM

## 2021-03-01 DIAGNOSIS — F32.4 MAJOR DEPRESSIVE DISORDER WITH SINGLE EPISODE, IN PARTIAL REMISSION (HCC): ICD-10-CM

## 2021-03-01 DIAGNOSIS — M81.0 AGE-RELATED OSTEOPOROSIS WITHOUT CURRENT PATHOLOGICAL FRACTURE: ICD-10-CM

## 2021-03-01 DIAGNOSIS — J47.9 BRONCHIECTASIS WITHOUT COMPLICATION (HCC): ICD-10-CM

## 2021-03-01 DIAGNOSIS — R73.9 HYPERGLYCEMIA: ICD-10-CM

## 2021-03-01 DIAGNOSIS — E78.00 PURE HYPERCHOLESTEROLEMIA: ICD-10-CM

## 2021-03-01 DIAGNOSIS — Z12.31 VISIT FOR SCREENING MAMMOGRAM: ICD-10-CM

## 2021-03-01 DIAGNOSIS — E66.9 OBESITY (BMI 30.0-34.9): ICD-10-CM

## 2021-03-01 DIAGNOSIS — E55.9 VITAMIN D DEFICIENCY: ICD-10-CM

## 2021-03-01 DIAGNOSIS — Z12.11 COLON CANCER SCREENING: ICD-10-CM

## 2021-03-01 DIAGNOSIS — Z86.39: ICD-10-CM

## 2021-03-01 DIAGNOSIS — Z00.00 ENCOUNTER FOR ANNUAL HEALTH EXAMINATION: Primary | ICD-10-CM

## 2021-03-01 PROBLEM — N18.30 CKD (CHRONIC KIDNEY DISEASE) STAGE 3, GFR 30-59 ML/MIN (HCC): Chronic | Status: RESOLVED | Noted: 2019-05-15 | Resolved: 2021-03-01

## 2021-03-01 PROCEDURE — 96160 PT-FOCUSED HLTH RISK ASSMT: CPT | Performed by: FAMILY MEDICINE

## 2021-03-01 PROCEDURE — G0439 PPPS, SUBSEQ VISIT: HCPCS | Performed by: FAMILY MEDICINE

## 2021-03-01 PROCEDURE — 3008F BODY MASS INDEX DOCD: CPT | Performed by: FAMILY MEDICINE

## 2021-03-01 PROCEDURE — 99397 PER PM REEVAL EST PAT 65+ YR: CPT | Performed by: FAMILY MEDICINE

## 2021-03-01 PROCEDURE — 3074F SYST BP LT 130 MM HG: CPT | Performed by: FAMILY MEDICINE

## 2021-03-01 PROCEDURE — 3078F DIAST BP <80 MM HG: CPT | Performed by: FAMILY MEDICINE

## 2021-03-01 NOTE — PATIENT INSTRUCTIONS
ISAAC Gil 19 SCREENING SCHEDULE   Tests on this list are recommended by your physician but may not be covered, or covered at this frequency, by your insurer. Please check with your insurance carrier before scheduling to verify coverage.    PREV the following criteria:   • Men who are 73-68 years old and have smoked more than 100 cigarettes in their lifetime   • Anyone with a family history    Colorectal Cancer Screening  Covered up to Age 76     Colonoscopy Screen   Covered every 10 years- more o orders found for this or any previous visit. Please get every year    Pneumococcal 13 (Prevnar)  Covered Once after 65 No orders found for this or any previous visit.  Please get once after your 65th birthday    Pneumococcal 23 (Pneumovax)  Covered Once aft

## 2021-03-01 NOTE — PROGRESS NOTES
HPI:   Tomeka Fournier is a 78year old female who presents for a MA (Medicare Advantage) Supervisit (Once per calendar year). Breast biopsy recommended based off MRI, although low suspicious for malignancy.  Pt declined biopsy and f/u with Dr. Jose Walsh to Face with patient and Family/surrogate (if present), and forms available to patient in AVS       She does NOT have a Power of  for [employer] on file in [place].    Advance care planning including the explanation and discussion of advance directive alendronate 70 MG Oral Tab, Take 1 tablet (70 mg total) by mouth once a week. Take in the morning and 30 minutes before eating or drinking take with plain water. •  Citalopram Hydrobromide 40 MG Oral Tab, Take 1 tablet (40 mg total) by mouth once daily. chest pain or palpitations  LUNG:  No SOB, cough or wheeze  GI:  No abdominal pain. No N/V/D/C  :  No dysuria  EXT:  No joint pain.   No LE swelling  PSYCH:  No mood concerns or anxiety      EXAM:   /64   Pulse 67   Temp 97.6 °F (36.4 °C) (Temporal Live (Zostavax) 08/08/2012   • Zoster Vaccine Recombinant Adjuvanted (Shingrix) 09/11/2020, 12/17/2020        ASSESSMENT AND OTHER RELEVANT CHRONIC CONDITIONS:   Kathrin Tom is a 78year old female who presents for a Medicare Assessment.      PLAN good energy level?: Appropriate Exercise  How would you describe your daily physical activity?: Moderate  How would you describe your current health state?: Good  How do you maintain positive mental well-being?: Social Interaction      This section provide Mammogram      Mammogram Annually to 78, then as discussed Mammogram due on 06/26/2021 Update Health Maintenance if applicable     Immunizations (Update Immunization Activity if applicable)     Influenza  Covered Annually 9/11/2020 Please get every year

## 2021-05-07 ENCOUNTER — OFFICE VISIT (OUTPATIENT)
Dept: SURGERY | Facility: CLINIC | Age: 71
End: 2021-05-07
Payer: MEDICARE

## 2021-05-07 VITALS
SYSTOLIC BLOOD PRESSURE: 126 MMHG | TEMPERATURE: 98 F | RESPIRATION RATE: 20 BRPM | HEIGHT: 63 IN | HEART RATE: 67 BPM | OXYGEN SATURATION: 97 % | BODY MASS INDEX: 29.38 KG/M2 | DIASTOLIC BLOOD PRESSURE: 80 MMHG | WEIGHT: 165.81 LBS

## 2021-05-07 DIAGNOSIS — R92.8 ABNORMAL MRI, BREAST: Primary | ICD-10-CM

## 2021-05-07 PROCEDURE — 3074F SYST BP LT 130 MM HG: CPT | Performed by: SURGERY

## 2021-05-07 PROCEDURE — 99205 OFFICE O/P NEW HI 60 MIN: CPT | Performed by: SURGERY

## 2021-05-07 PROCEDURE — 3008F BODY MASS INDEX DOCD: CPT | Performed by: SURGERY

## 2021-05-07 PROCEDURE — 3079F DIAST BP 80-89 MM HG: CPT | Performed by: SURGERY

## 2021-05-07 NOTE — PROGRESS NOTES
Breast Surgery New Patient Consultation    This is the first visit for this 78year old woman, referred by Dr. Law Blackburn, who presents for evaluation of abnormal breast imaging.     History of Present Illness:   Ms. Felix oB is a 78year old woman history of 3 months. She achieved menarche at age 17  Age of Menopause: 54  Type: Natural  She denies any history of hormone replacement therapy  years, . She has history of oral contraceptive use for 4 years, last in 1985.   She has recieved infertility or abnormal sound when breathing. Cardiovascular:   There is no history of chest pain, chest pressure/discomfort, palpitations, irregular heartbeat, fainting or near-fainting, difficulty breathing when lying flat, SOB/Coughing at night, swelling of the Pulse 67   Temp 97.9 °F (36.6 °C) (Oral)   Resp 20   Ht 1.6 m (5' 3\")   Wt 75.2 kg (165 lb 12.8 oz)   SpO2 97%   BMI 29.37 kg/m²     Physical Exam:  The patient is an alert, oriented, well-nourished and  well-developed woman who appears her stated age.  He discussion with the Patient regarding her breast exam. On exam today I found no clinical evidence of malignancy bilaterally. I personally reviewed her recent imaging we discussed this at length.   The significance and implications of the asymmetry seen on

## 2021-05-19 ENCOUNTER — TELEPHONE (OUTPATIENT)
Dept: SURGERY | Facility: CLINIC | Age: 71
End: 2021-05-19

## 2021-06-02 DIAGNOSIS — I10 ESSENTIAL HYPERTENSION: ICD-10-CM

## 2021-06-02 DIAGNOSIS — F32.4 MAJOR DEPRESSIVE DISORDER WITH SINGLE EPISODE, IN PARTIAL REMISSION (HCC): ICD-10-CM

## 2021-06-02 RX ORDER — METOPROLOL SUCCINATE 50 MG/1
TABLET, EXTENDED RELEASE ORAL
Qty: 90 TABLET | Refills: 3 | Status: SHIPPED | OUTPATIENT
Start: 2021-06-02

## 2021-06-02 RX ORDER — LISINOPRIL AND HYDROCHLOROTHIAZIDE 25; 20 MG/1; MG/1
TABLET ORAL
Qty: 90 TABLET | Refills: 3 | Status: SHIPPED | OUTPATIENT
Start: 2021-06-02

## 2021-06-02 RX ORDER — CITALOPRAM 40 MG/1
TABLET ORAL
Qty: 90 TABLET | Refills: 3 | Status: SHIPPED | OUTPATIENT
Start: 2021-06-02 | End: 2021-08-28

## 2021-06-02 NOTE — TELEPHONE ENCOUNTER
Requested Prescriptions     Pending Prescriptions Disp Refills   • CITALOPRAM HYDROBROMIDE 40 MG Oral Tab [Pharmacy Med Name: CITALOPRAM HYDROBROMIDE 40 MG Tablet] 90 tablet 3     Sig: TAKE 1 TABLET EVERY DAY   • LISINOPRIL-HYDROCHLOROTHIAZIDE 20-25 MG Ora

## 2021-06-03 ENCOUNTER — HOSPITAL ENCOUNTER (OUTPATIENT)
Dept: MRI IMAGING | Facility: HOSPITAL | Age: 71
Discharge: HOME | End: 2021-06-03
Attending: SURGERY
Payer: MEDICARE

## 2021-06-03 DIAGNOSIS — R92.8 ABNORMAL MRI, BREAST: ICD-10-CM

## 2021-06-03 PROCEDURE — A9575 INJ GADOTERATE MEGLUMI 0.1ML: HCPCS | Performed by: SURGERY

## 2021-06-03 PROCEDURE — 77049 MRI BREAST C-+ W/CAD BI: CPT | Performed by: SURGERY

## 2021-06-07 ENCOUNTER — TELEPHONE (OUTPATIENT)
Dept: FAMILY MEDICINE CLINIC | Facility: CLINIC | Age: 71
End: 2021-06-07

## 2021-06-07 DIAGNOSIS — L98.9 SKIN ABNORMALITIES: Primary | ICD-10-CM

## 2021-06-07 NOTE — TELEPHONE ENCOUNTER
Referral request Dr. Rayshawn Hickey M.D.     Six visits    Diagnosis: L98.9    Auth per [facility] [id number] valid 6/7/21 to 12/04/2021

## 2021-06-20 DIAGNOSIS — M81.0 AGE-RELATED OSTEOPOROSIS WITHOUT CURRENT PATHOLOGICAL FRACTURE: ICD-10-CM

## 2021-06-22 RX ORDER — ALENDRONATE SODIUM 70 MG/1
TABLET ORAL
Qty: 12 TABLET | Refills: 3 | Status: SHIPPED | OUTPATIENT
Start: 2021-06-22

## 2021-08-02 ENCOUNTER — TELEPHONE (OUTPATIENT)
Dept: FAMILY MEDICINE CLINIC | Facility: CLINIC | Age: 71
End: 2021-08-02

## 2021-08-02 NOTE — TELEPHONE ENCOUNTER
Pt would like to see a Dandy Go MD in psychiatry. She prefers to see a woman. Her insurance said she doesn't need any referral for a person in network and she is close to home and practices at [facility].

## 2021-08-05 ENCOUNTER — TELEPHONE (OUTPATIENT)
Dept: FAMILY MEDICINE CLINIC | Facility: CLINIC | Age: 71
End: 2021-08-05

## 2021-08-05 DIAGNOSIS — Z13.220 SCREENING, LIPID: ICD-10-CM

## 2021-08-05 DIAGNOSIS — Z13.29 SCREENING FOR ENDOCRINE, METABOLIC AND IMMUNITY DISORDER: ICD-10-CM

## 2021-08-05 DIAGNOSIS — Z13.228 SCREENING FOR ENDOCRINE, METABOLIC AND IMMUNITY DISORDER: ICD-10-CM

## 2021-08-05 DIAGNOSIS — R73.9 HYPERGLYCEMIA: ICD-10-CM

## 2021-08-05 DIAGNOSIS — E55.9 VITAMIN D DEFICIENCY: Primary | ICD-10-CM

## 2021-08-05 DIAGNOSIS — Z13.0 SCREENING FOR ENDOCRINE, METABOLIC AND IMMUNITY DISORDER: ICD-10-CM

## 2021-08-07 LAB
ALBUMIN/GLOBULIN RATIO: 1.1 (CALC) (ref 1–2.5)
ALBUMIN: 4 G/DL (ref 3.6–5.1)
ALKALINE PHOSPHATASE: 48 U/L (ref 37–153)
ALT: 16 U/L (ref 6–29)
AST: 16 U/L (ref 10–35)
BILIRUBIN, TOTAL: 0.7 MG/DL (ref 0.2–1.2)
BUN: 15 MG/DL (ref 7–25)
CALCIUM: 9.6 MG/DL (ref 8.6–10.4)
CARBON DIOXIDE: 33 MMOL/L (ref 20–32)
CHLORIDE: 96 MMOL/L (ref 98–110)
CHOL/HDLC RATIO: 4.1 (CALC)
CHOLESTEROL, TOTAL: 204 MG/DL
CREATININE: 0.84 MG/DL (ref 0.5–0.99)
EGFR IF AFRICN AM: 82 ML/MIN/1.73M2
EGFR IF NONAFRICN AM: 71 ML/MIN/1.73M2
GLOBULIN: 3.8 G/DL (CALC) (ref 1.9–3.7)
GLUCOSE: 102 MG/DL (ref 65–99)
HDL CHOLESTEROL: 50 MG/DL
HEMOGLOBIN A1C: 6.3 % OF TOTAL HGB
LDL-CHOLESTEROL: 130 MG/DL (CALC)
NON-HDL CHOLESTEROL: 154 MG/DL (CALC)
POTASSIUM: 4 MMOL/L (ref 3.5–5.3)
PROTEIN, TOTAL: 7.8 G/DL (ref 6.1–8.1)
SODIUM: 137 MMOL/L (ref 135–146)
TRIGLYCERIDES: 128 MG/DL
VITAMIN D, 25-OH, TOTAL: 53 NG/ML (ref 30–100)

## 2021-08-28 PROBLEM — F41.9 ANXIETY DISORDER: Status: ACTIVE | Noted: 2021-08-28

## 2021-09-03 ENCOUNTER — OFFICE VISIT (OUTPATIENT)
Dept: FAMILY MEDICINE CLINIC | Facility: CLINIC | Age: 71
End: 2021-09-03
Payer: MEDICARE

## 2021-09-03 VITALS
BODY MASS INDEX: 30.35 KG/M2 | WEIGHT: 169.19 LBS | SYSTOLIC BLOOD PRESSURE: 128 MMHG | HEIGHT: 62.5 IN | DIASTOLIC BLOOD PRESSURE: 78 MMHG | HEART RATE: 80 BPM | RESPIRATION RATE: 16 BRPM

## 2021-09-03 DIAGNOSIS — I10 ESSENTIAL HYPERTENSION: Primary | ICD-10-CM

## 2021-09-03 DIAGNOSIS — E78.00 PURE HYPERCHOLESTEROLEMIA: ICD-10-CM

## 2021-09-03 DIAGNOSIS — F32.4 MAJOR DEPRESSIVE DISORDER WITH SINGLE EPISODE, IN PARTIAL REMISSION (HCC): ICD-10-CM

## 2021-09-03 DIAGNOSIS — Z12.11 COLON CANCER SCREENING: ICD-10-CM

## 2021-09-03 PROCEDURE — 3008F BODY MASS INDEX DOCD: CPT | Performed by: FAMILY MEDICINE

## 2021-09-03 PROCEDURE — 3074F SYST BP LT 130 MM HG: CPT | Performed by: FAMILY MEDICINE

## 2021-09-03 PROCEDURE — 99214 OFFICE O/P EST MOD 30 MIN: CPT | Performed by: FAMILY MEDICINE

## 2021-09-03 PROCEDURE — 3078F DIAST BP <80 MM HG: CPT | Performed by: FAMILY MEDICINE

## 2021-09-03 NOTE — PROGRESS NOTES
Nic Beckett is a 73year old female. HPI:   Patient presents for a medication follow up. Hx of abnormal mammogram:  MRI in 2020 and 2021. Biopsy declined by patient.   Saw Dr. Lyndsay Zayas in 2021 - planning for annual mammogram.      Marifer Crowell 165 lb 12.8 oz (75.2 kg)  03/01/21 : 168 lb (76.2 kg)  09/01/20 : 175 lb (79.4 kg)  03/02/20 : 173 lb (78.5 kg)  10/31/19 : 166 lb (75.3 kg)    Body mass index is 30.45 kg/m².     REVIEW OF SYSTEMS:   GENERAL HEALTH: feels well otherwise  RESPIRATORY: laura

## 2021-10-02 ENCOUNTER — LAB ENCOUNTER (OUTPATIENT)
Dept: LAB | Facility: HOSPITAL | Age: 71
End: 2021-10-02
Attending: INTERNAL MEDICINE
Payer: MEDICARE

## 2021-10-02 DIAGNOSIS — Z01.818 PRE-OP TESTING: ICD-10-CM

## 2021-10-05 PROBLEM — Z12.11 SPECIAL SCREENING FOR MALIGNANT NEOPLASMS, COLON: Status: ACTIVE | Noted: 2021-10-05

## 2022-03-07 ENCOUNTER — MA CHART PREP (OUTPATIENT)
Dept: FAMILY MEDICINE CLINIC | Facility: CLINIC | Age: 72
End: 2022-03-07

## 2022-03-07 PROBLEM — R92.8 ABNORMAL MRI, BREAST: Status: ACTIVE | Noted: 2022-03-07

## 2022-03-08 ENCOUNTER — OFFICE VISIT (OUTPATIENT)
Dept: FAMILY MEDICINE CLINIC | Facility: CLINIC | Age: 72
End: 2022-03-08
Payer: MEDICARE

## 2022-03-08 VITALS
HEIGHT: 62 IN | WEIGHT: 171.19 LBS | TEMPERATURE: 99 F | RESPIRATION RATE: 16 BRPM | DIASTOLIC BLOOD PRESSURE: 82 MMHG | HEART RATE: 90 BPM | SYSTOLIC BLOOD PRESSURE: 130 MMHG | BODY MASS INDEX: 31.5 KG/M2

## 2022-03-08 DIAGNOSIS — Z12.31 VISIT FOR SCREENING MAMMOGRAM: ICD-10-CM

## 2022-03-08 DIAGNOSIS — M81.0 AGE-RELATED OSTEOPOROSIS WITHOUT CURRENT PATHOLOGICAL FRACTURE: ICD-10-CM

## 2022-03-08 DIAGNOSIS — Z00.00 ENCOUNTER FOR ANNUAL HEALTH EXAMINATION: Primary | ICD-10-CM

## 2022-03-08 DIAGNOSIS — Z86.39: ICD-10-CM

## 2022-03-08 DIAGNOSIS — R92.8 ABNORMAL MRI, BREAST: ICD-10-CM

## 2022-03-08 DIAGNOSIS — F32.4 MAJOR DEPRESSIVE DISORDER WITH SINGLE EPISODE, IN PARTIAL REMISSION (HCC): ICD-10-CM

## 2022-03-08 DIAGNOSIS — R73.9 HYPERGLYCEMIA: ICD-10-CM

## 2022-03-08 DIAGNOSIS — J47.9 BRONCHIECTASIS WITHOUT COMPLICATION (HCC): ICD-10-CM

## 2022-03-08 DIAGNOSIS — E55.9 VITAMIN D DEFICIENCY: ICD-10-CM

## 2022-03-08 DIAGNOSIS — I10 ESSENTIAL HYPERTENSION: ICD-10-CM

## 2022-03-08 DIAGNOSIS — G47.33 OSA (OBSTRUCTIVE SLEEP APNEA): ICD-10-CM

## 2022-03-08 DIAGNOSIS — Z78.0 POSTMENOPAUSAL ESTROGEN DEFICIENCY: ICD-10-CM

## 2022-03-08 DIAGNOSIS — F41.9 ANXIETY DISORDER, UNSPECIFIED TYPE: ICD-10-CM

## 2022-03-08 DIAGNOSIS — E66.9 OBESITY (BMI 30.0-34.9): ICD-10-CM

## 2022-03-08 DIAGNOSIS — E78.00 PURE HYPERCHOLESTEROLEMIA: ICD-10-CM

## 2022-03-08 PROBLEM — Z12.11 SPECIAL SCREENING FOR MALIGNANT NEOPLASMS, COLON: Status: RESOLVED | Noted: 2021-10-05 | Resolved: 2022-03-08

## 2022-03-08 PROCEDURE — 3079F DIAST BP 80-89 MM HG: CPT | Performed by: FAMILY MEDICINE

## 2022-03-08 PROCEDURE — 96160 PT-FOCUSED HLTH RISK ASSMT: CPT | Performed by: FAMILY MEDICINE

## 2022-03-08 PROCEDURE — 3075F SYST BP GE 130 - 139MM HG: CPT | Performed by: FAMILY MEDICINE

## 2022-03-08 PROCEDURE — 99397 PER PM REEVAL EST PAT 65+ YR: CPT | Performed by: FAMILY MEDICINE

## 2022-03-08 PROCEDURE — 3008F BODY MASS INDEX DOCD: CPT | Performed by: FAMILY MEDICINE

## 2022-03-08 PROCEDURE — G0439 PPPS, SUBSEQ VISIT: HCPCS | Performed by: FAMILY MEDICINE

## 2022-03-31 RX ORDER — ALENDRONATE SODIUM 70 MG/1
TABLET ORAL
Qty: 12 TABLET | Refills: 3 | Status: SHIPPED | OUTPATIENT
Start: 2022-03-31

## 2022-05-30 DIAGNOSIS — I10 ESSENTIAL HYPERTENSION: ICD-10-CM

## 2022-05-31 RX ORDER — METOPROLOL SUCCINATE 50 MG/1
TABLET, EXTENDED RELEASE ORAL
Qty: 90 TABLET | Refills: 3 | Status: SHIPPED | OUTPATIENT
Start: 2022-05-31

## 2022-05-31 RX ORDER — LISINOPRIL AND HYDROCHLOROTHIAZIDE 25; 20 MG/1; MG/1
TABLET ORAL
Qty: 90 TABLET | Refills: 3 | Status: SHIPPED | OUTPATIENT
Start: 2022-05-31

## 2022-06-13 ENCOUNTER — HOSPITAL ENCOUNTER (OUTPATIENT)
Dept: BONE DENSITY | Age: 72
Discharge: HOME | End: 2022-06-13
Attending: FAMILY MEDICINE
Payer: MEDICARE

## 2022-06-13 ENCOUNTER — HOSPITAL ENCOUNTER (OUTPATIENT)
Dept: MAMMOGRAPHY | Age: 72
Discharge: HOME | End: 2022-06-13
Attending: FAMILY MEDICINE
Payer: MEDICARE

## 2022-06-13 DIAGNOSIS — Z78.0 POSTMENOPAUSAL ESTROGEN DEFICIENCY: ICD-10-CM

## 2022-06-13 DIAGNOSIS — Z12.31 VISIT FOR SCREENING MAMMOGRAM: ICD-10-CM

## 2022-06-13 PROCEDURE — 77080 DXA BONE DENSITY AXIAL: CPT | Performed by: FAMILY MEDICINE

## 2022-06-13 PROCEDURE — 77067 SCR MAMMO BI INCL CAD: CPT | Performed by: FAMILY MEDICINE

## 2022-06-13 PROCEDURE — 77063 BREAST TOMOSYNTHESIS BI: CPT | Performed by: FAMILY MEDICINE

## 2022-06-14 ENCOUNTER — TELEPHONE (OUTPATIENT)
Dept: FAMILY MEDICINE CLINIC | Facility: CLINIC | Age: 72
End: 2022-06-14

## 2022-06-14 NOTE — TELEPHONE ENCOUNTER
Called and talked to patient and all her family has dense breasts and she feels she does not need this done she had the same work up last year evaluated by Dr Harsh Velázquez and told she does not need the further work up. She has no HX of cancer in her family and all her family members have dense breasts.

## 2022-06-14 NOTE — TELEPHONE ENCOUNTER
Edelmira from Mammography Dept calling to inform us that pt was to have additional views but has decline to do so.

## 2022-06-16 DIAGNOSIS — R92.8 ABNORMAL MAMMOGRAM: Primary | ICD-10-CM

## 2022-09-03 LAB
ALBUMIN/GLOBULIN RATIO: 1 (CALC) (ref 1–2.5)
ALBUMIN: 4.2 G/DL (ref 3.6–5.1)
ALKALINE PHOSPHATASE: 63 U/L (ref 37–153)
ALT: 25 U/L (ref 6–29)
AST: 22 U/L (ref 10–35)
BILIRUBIN, TOTAL: 0.8 MG/DL (ref 0.2–1.2)
BUN/CREATININE RATIO: 17 (CALC) (ref 6–22)
BUN: 18 MG/DL (ref 7–25)
CALCIUM: 9.6 MG/DL (ref 8.6–10.4)
CARBON DIOXIDE: 31 MMOL/L (ref 20–32)
CHLORIDE: 98 MMOL/L (ref 98–110)
CHOL/HDLC RATIO: 4.4 (CALC)
CHOLESTEROL, TOTAL: 209 MG/DL
CREATININE: 1.09 MG/DL (ref 0.6–1)
EGFR: 55 ML/MIN/1.73M2
GLOBULIN: 4.4 G/DL (CALC) (ref 1.9–3.7)
GLUCOSE: 117 MG/DL (ref 65–99)
HDL CHOLESTEROL: 47 MG/DL
HEMOGLOBIN A1C: 6.3 % OF TOTAL HGB
LDL-CHOLESTEROL: 132 MG/DL (CALC)
NON-HDL CHOLESTEROL: 162 MG/DL (CALC)
POTASSIUM: 3.9 MMOL/L (ref 3.5–5.3)
PROTEIN, TOTAL: 8.6 G/DL (ref 6.1–8.1)
SODIUM: 137 MMOL/L (ref 135–146)
TRIGLYCERIDES: 165 MG/DL
VITAMIN D, 25-OH, TOTAL: 57 NG/ML (ref 30–100)

## 2022-09-09 ENCOUNTER — OFFICE VISIT (OUTPATIENT)
Dept: FAMILY MEDICINE CLINIC | Facility: CLINIC | Age: 72
End: 2022-09-09
Payer: MEDICARE

## 2022-09-09 VITALS
DIASTOLIC BLOOD PRESSURE: 88 MMHG | SYSTOLIC BLOOD PRESSURE: 128 MMHG | TEMPERATURE: 97 F | HEIGHT: 62.25 IN | RESPIRATION RATE: 16 BRPM | WEIGHT: 175.19 LBS | BODY MASS INDEX: 31.83 KG/M2

## 2022-09-09 DIAGNOSIS — Z12.31 VISIT FOR SCREENING MAMMOGRAM: ICD-10-CM

## 2022-09-09 DIAGNOSIS — M81.0 AGE-RELATED OSTEOPOROSIS WITHOUT CURRENT PATHOLOGICAL FRACTURE: ICD-10-CM

## 2022-09-09 DIAGNOSIS — F32.4 MAJOR DEPRESSIVE DISORDER WITH SINGLE EPISODE, IN PARTIAL REMISSION (HCC): ICD-10-CM

## 2022-09-09 DIAGNOSIS — E78.00 PURE HYPERCHOLESTEROLEMIA: Primary | ICD-10-CM

## 2022-09-09 DIAGNOSIS — I10 ESSENTIAL HYPERTENSION: ICD-10-CM

## 2022-09-09 PROCEDURE — 3079F DIAST BP 80-89 MM HG: CPT | Performed by: FAMILY MEDICINE

## 2022-09-09 PROCEDURE — 99214 OFFICE O/P EST MOD 30 MIN: CPT | Performed by: FAMILY MEDICINE

## 2022-09-09 PROCEDURE — 3074F SYST BP LT 130 MM HG: CPT | Performed by: FAMILY MEDICINE

## 2022-09-09 PROCEDURE — 3008F BODY MASS INDEX DOCD: CPT | Performed by: FAMILY MEDICINE

## 2022-09-09 NOTE — PATIENT INSTRUCTIONS
A heart scan, a CT scan of the heart, is the safest and most accurate screening tool for detecting the early build-up of calcium in the coronary arteries, the most common cause of heart disease. This simple, painless and potentially lifesaving test takes just 15 minutes.     To schedule call [phone]    Heart scan locations:  [facility] [address] [place] [address] (enter through the Emergency Dept.)    Make an appointment for a heart scan if you are male over age 36 or a female over age 39, and have one or more of these risk factors:  High blood pressure  High cholesterol  Smoking  Obesity  Diabetes  Family history of heart disease

## 2022-10-10 ENCOUNTER — TELEPHONE (OUTPATIENT)
Dept: FAMILY MEDICINE CLINIC | Facility: CLINIC | Age: 72
End: 2022-10-10

## 2022-10-10 DIAGNOSIS — H91.90 HEARING DISORDER, UNSPECIFIED LATERALITY: Primary | ICD-10-CM

## 2022-10-10 NOTE — TELEPHONE ENCOUNTER
Referral request Dr. Amrita Curtis M.D.     Six visits    Diagnosis: H91.90    auth per [facility] [place] [place] [id number] valid 10/10 to 4/6/2023

## 2022-10-20 ENCOUNTER — OFFICE VISIT (OUTPATIENT)
Facility: LOCATION | Age: 72
End: 2022-10-20
Payer: MEDICARE

## 2022-10-20 DIAGNOSIS — H69.81 DYSFUNCTION OF RIGHT EUSTACHIAN TUBE: Primary | ICD-10-CM

## 2022-10-20 DIAGNOSIS — H93.291 ABNORMAL AUDITORY PERCEPTION OF RIGHT EAR: Primary | ICD-10-CM

## 2022-10-20 PROCEDURE — 99203 OFFICE O/P NEW LOW 30 MIN: CPT | Performed by: OTOLARYNGOLOGY

## 2022-10-20 PROCEDURE — 92567 TYMPANOMETRY: CPT | Performed by: AUDIOLOGIST

## 2022-10-20 PROCEDURE — 92557 COMPREHENSIVE HEARING TEST: CPT | Performed by: AUDIOLOGIST

## 2022-10-20 NOTE — PROGRESS NOTES
Edita Méndez was seen for an audiometric evaluation and tympanogram today. Referred back to physician.     Marium Camargo MS, CCC-A

## 2023-01-19 NOTE — TELEPHONE ENCOUNTER
Anesthesia Pre-Procedure Evaluation    Patient: Billy Carey   MRN: [id number] : 1986        Procedure : Procedure(s):  Lymph Node excisional biopsy          Past Medical History:   Diagnosis Date     Diabetes mellitus type 1, uncontrolled, insulin dependent 2013      Past Surgical History:   Procedure Laterality Date     MYRINGOTOMY, INSERT TUBE BILATERAL, COMBINED Bilateral     As a child      Allergies   Allergen Reactions     Penicillins Rash      Social History     Tobacco Use     Smoking status: Never     Smokeless tobacco: Never   Substance Use Topics     Alcohol use: No     Comment: rarely      Wt Readings from Last 1 Encounters:   23 124.8 kg (275 lb 3.2 oz)        Anesthesia Evaluation            ROS/MED HX  ENT/Pulmonary:       Neurologic:       Cardiovascular:       METS/Exercise Tolerance:     Hematologic: Comments: Recent thrombocytopenia and  lymphadenopathy       Musculoskeletal:       GI/Hepatic:       Renal/Genitourinary:       Endo:     (+) type I DM, Obesity,     Psychiatric/Substance Use:       Infectious Disease:       Malignancy:       Other:            Physical Exam    Airway  airway exam normal           Respiratory Devices and Support         Dental       (+) Completely normal teeth      Cardiovascular   cardiovascular exam normal          Pulmonary   pulmonary exam normal                OUTSIDE LABS:  CBC:   Lab Results   Component Value Date    WBC 6.5 01/10/2023    WBC 6.6 2022    HGB 16.4 01/10/2023    HGB 17.3 2022    HCT 45.8 01/10/2023    HCT 48.4 2022    PLT 45 (LL) 01/10/2023    PLT 59 (L) 2022     BMP:   Lab Results   Component Value Date     01/10/2023     2022    POTASSIUM 4.4 01/10/2023    POTASSIUM 4.0 2022    CHLORIDE 100 01/10/2023    CHLORIDE 98 2022    CO2 25 01/10/2023    CO2 26 2022    BUN 14.0 01/10/2023    BUN 11.0 2022    CR 1.03 01/10/2023    CR 1.23 (H) 2022     (H)  Please assist with scheduling Medicare Supervisit for patient in February 2019 with   Dr Bob Oquendo 01/10/2023     (H) 12/14/2022     COAGS:   Lab Results   Component Value Date    PTT 26 01/10/2023    INR 1.06 01/10/2023    FIBR 269 01/10/2023     POC:   Lab Results   Component Value Date     (H) 08/17/2019     HEPATIC:   Lab Results   Component Value Date    ALBUMIN 4.6 01/10/2023    PROTTOTAL 6.9 01/10/2023    ALT 26 01/10/2023    AST 29 01/10/2023    ALKPHOS 63 01/10/2023    BILITOTAL 1.2 01/10/2023     OTHER:   Lab Results   Component Value Date    A1C 6.0 (H) 11/30/2022    DEBORAH 9.3 01/10/2023    MAG 1.9 12/15/2021    TSH 3.85 12/06/2022    T4 1.03 04/14/2017    CRP <3.00 01/10/2023    SED 6 01/10/2023       Anesthesia Plan    ASA Status:  3      Anesthesia Type: MAC.     - Reason for MAC: straight local not clinically adequate, immobility needed   Induction: Intravenous.   Maintenance: TIVA.        Consents    Anesthesia Plan(s) and associated risks, benefits, and realistic alternatives discussed. Questions answered and patient/representative(s) expressed understanding.     - Discussed: Risks, Benefits and Alternatives for BOTH SEDATION and the PROCEDURE were discussed     - Discussed with:  Patient         Postoperative Care    Pain management: IV analgesics.   PONV prophylaxis: Ondansetron (or other 5HT-3), Dexamethasone or Solumedrol     Comments:                Jarred Stiles MD

## 2023-02-11 DIAGNOSIS — F32.4 MAJOR DEPRESSIVE DISORDER WITH SINGLE EPISODE, IN PARTIAL REMISSION (HCC): ICD-10-CM

## 2023-02-11 RX ORDER — VENLAFAXINE HYDROCHLORIDE 150 MG/1
CAPSULE, EXTENDED RELEASE ORAL
Qty: 90 CAPSULE | Refills: 3 | Status: SHIPPED | OUTPATIENT
Start: 2023-02-11

## 2023-02-16 ENCOUNTER — HOSPITAL ENCOUNTER (OUTPATIENT)
Dept: CT IMAGING | Age: 73
Discharge: HOME | End: 2023-02-16
Attending: FAMILY MEDICINE

## 2023-02-16 VITALS
HEIGHT: 62.25 IN | SYSTOLIC BLOOD PRESSURE: 138 MMHG | WEIGHT: 175 LBS | DIASTOLIC BLOOD PRESSURE: 88 MMHG | BODY MASS INDEX: 31.8 KG/M2

## 2023-02-16 DIAGNOSIS — Z13.6 SCREENING FOR CARDIOVASCULAR CONDITION: ICD-10-CM

## 2023-03-07 ENCOUNTER — OFFICE VISIT (OUTPATIENT)
Dept: FAMILY MEDICINE CLINIC | Facility: CLINIC | Age: 73
End: 2023-03-07
Payer: MEDICARE

## 2023-03-07 VITALS
BODY MASS INDEX: 32.34 KG/M2 | RESPIRATION RATE: 16 BRPM | HEART RATE: 74 BPM | SYSTOLIC BLOOD PRESSURE: 126 MMHG | HEIGHT: 62.25 IN | DIASTOLIC BLOOD PRESSURE: 82 MMHG | WEIGHT: 178 LBS | TEMPERATURE: 98 F

## 2023-03-07 DIAGNOSIS — E78.00 PURE HYPERCHOLESTEROLEMIA: ICD-10-CM

## 2023-03-07 DIAGNOSIS — Z00.00 ENCOUNTER FOR ANNUAL HEALTH EXAMINATION: Primary | ICD-10-CM

## 2023-03-07 DIAGNOSIS — E55.9 VITAMIN D DEFICIENCY: ICD-10-CM

## 2023-03-07 DIAGNOSIS — I10 ESSENTIAL HYPERTENSION: ICD-10-CM

## 2023-03-07 DIAGNOSIS — J47.9 BRONCHIECTASIS WITHOUT COMPLICATION (HCC): ICD-10-CM

## 2023-03-07 DIAGNOSIS — M81.0 AGE-RELATED OSTEOPOROSIS WITHOUT CURRENT PATHOLOGICAL FRACTURE: ICD-10-CM

## 2023-03-07 DIAGNOSIS — R92.8 ABNORMAL MRI, BREAST: ICD-10-CM

## 2023-03-07 DIAGNOSIS — R73.9 HYPERGLYCEMIA: ICD-10-CM

## 2023-03-07 DIAGNOSIS — F32.4 MAJOR DEPRESSIVE DISORDER WITH SINGLE EPISODE, IN PARTIAL REMISSION (HCC): ICD-10-CM

## 2023-03-07 DIAGNOSIS — G47.33 OSA (OBSTRUCTIVE SLEEP APNEA): ICD-10-CM

## 2023-03-07 DIAGNOSIS — Z86.018: ICD-10-CM

## 2023-03-07 PROBLEM — E66.811 OBESITY (BMI 30.0-34.9): Status: RESOLVED | Noted: 2021-02-26 | Resolved: 2023-03-07

## 2023-03-07 PROBLEM — F41.9 ANXIETY DISORDER: Status: RESOLVED | Noted: 2021-08-28 | Resolved: 2023-03-07

## 2023-03-07 PROBLEM — E66.9 OBESITY (BMI 30.0-34.9): Status: RESOLVED | Noted: 2021-02-26 | Resolved: 2023-03-07

## 2023-05-12 ENCOUNTER — HOSPITAL ENCOUNTER (OUTPATIENT)
Dept: ULTRASOUND IMAGING | Age: 73
Discharge: HOME | End: 2023-05-12
Attending: FAMILY MEDICINE

## 2023-05-12 VITALS — DIASTOLIC BLOOD PRESSURE: 86 MMHG | SYSTOLIC BLOOD PRESSURE: 122 MMHG

## 2023-05-12 DIAGNOSIS — Z13.9 ENCOUNTER FOR SCREENING: ICD-10-CM

## 2023-05-12 NOTE — PROGRESS NOTES
Date of Service 2023    Dusty Palomo  Date of Birth 1950    Patient Age: 81year old    PCP: Anastacia Hay MD  [address] [facility] [facility] [address]    Consult Type  Type Scan/Screening: PV Screening     No abdominal aortic aneurysm. Left Carotid Artery: Normal  Right Carotid Artery: Normal       Lipid Profile  Cholesterol: 209, done on 2022. HDL Cholesterol: 47, done on 2022. LDL Cholesterol: 132, done on 2022. TriGlycerides 165, done on 2022. Nurse Review  Risk factor information and results reviewed with Nurse: Yes    Recommended Follow Up:  Consult your physician regarding[de-identified]   Final Peripheral Vascular Stroke Screen Report; Discuss potential for Incidental Finding;  Glucose (Fasting)     Patient will call the RN Line [phone] in 3 years to discuss possibility of free PV Stroke Screening testing at that time. Recommendations for Change:  Nutrition Changes: Low Saturated Fat;Low Fat Dairy; Increase Fiber     Cholesterol Modification (goal of therapy depends upon your risk): Increase HDL (Healthy/Good) Normal >45 Men >55 Women;  Decrease LDL (Lousy/Bad) Ideal <100     (Today's FASTING Cholestech Values: Total Cholesterol-166, HDL-34, LDL-106, Triglycerides-133, Glucose-117)    Exercise: Enhance Current Program              Repeat PV Screening: 3 Years     Other[de-identified]  Today's blood pressure readin/86; right arm, sitting. Jose Recommended Resources:  Recommended Resources: Upcoming Classes, Medical Services and [facility]. Health. Marcia Lopez RN        Please Contact the Nurse Heart Line with any Questions or Concerns [phone].

## 2023-07-21 ENCOUNTER — OFFICE VISIT (OUTPATIENT)
Dept: FAMILY MEDICINE CLINIC | Facility: CLINIC | Age: 73
End: 2023-07-21
Payer: MEDICARE

## 2023-07-21 VITALS
BODY MASS INDEX: 32.02 KG/M2 | OXYGEN SATURATION: 98 % | WEIGHT: 174 LBS | RESPIRATION RATE: 16 BRPM | DIASTOLIC BLOOD PRESSURE: 78 MMHG | TEMPERATURE: 97 F | HEIGHT: 62 IN | HEART RATE: 73 BPM | SYSTOLIC BLOOD PRESSURE: 120 MMHG

## 2023-07-21 DIAGNOSIS — F41.9 ANXIETY: ICD-10-CM

## 2023-07-21 DIAGNOSIS — I10 ESSENTIAL HYPERTENSION: Primary | ICD-10-CM

## 2023-07-21 PROCEDURE — 1159F MED LIST DOCD IN RCRD: CPT | Performed by: NURSE PRACTITIONER

## 2023-07-21 PROCEDURE — 99214 OFFICE O/P EST MOD 30 MIN: CPT | Performed by: NURSE PRACTITIONER

## 2023-07-21 PROCEDURE — 3008F BODY MASS INDEX DOCD: CPT | Performed by: NURSE PRACTITIONER

## 2023-07-21 PROCEDURE — 3078F DIAST BP <80 MM HG: CPT | Performed by: NURSE PRACTITIONER

## 2023-07-21 PROCEDURE — 3074F SYST BP LT 130 MM HG: CPT | Performed by: NURSE PRACTITIONER

## 2023-07-21 RX ORDER — BUSPIRONE HYDROCHLORIDE 10 MG/1
10 TABLET ORAL 3 TIMES DAILY
Qty: 60 TABLET | Refills: 1 | Status: SHIPPED | OUTPATIENT
Start: 2023-07-21

## 2023-07-21 NOTE — PATIENT INSTRUCTIONS
Check blood pressure 2-3 times weekly at a different time of day each check. Sit quietly for at least 5 minutes prior to checking blood pressure. Sit up straight with both feet flat on the floor, with arm elevated to approximately the level of your heart. Record blood pressure readings (date, time and result) and bring readings with you to next visit in our office. If more than 30% of readings are over 140 on the top or 90 on the bottom, notify our office.

## 2023-09-16 ENCOUNTER — OFFICE VISIT (OUTPATIENT)
Dept: FAMILY MEDICINE CLINIC | Facility: CLINIC | Age: 73
End: 2023-09-16
Payer: MEDICARE

## 2023-09-16 VITALS
DIASTOLIC BLOOD PRESSURE: 70 MMHG | OXYGEN SATURATION: 99 % | WEIGHT: 175 LBS | BODY MASS INDEX: 32.2 KG/M2 | SYSTOLIC BLOOD PRESSURE: 120 MMHG | RESPIRATION RATE: 16 BRPM | HEART RATE: 97 BPM | HEIGHT: 62 IN

## 2023-09-16 DIAGNOSIS — H66.90 ACUTE OTITIS MEDIA, UNSPECIFIED OTITIS MEDIA TYPE: Primary | ICD-10-CM

## 2023-09-16 DIAGNOSIS — F41.9 ANXIETY: ICD-10-CM

## 2023-09-16 PROCEDURE — 3074F SYST BP LT 130 MM HG: CPT | Performed by: NURSE PRACTITIONER

## 2023-09-16 PROCEDURE — 3078F DIAST BP <80 MM HG: CPT | Performed by: NURSE PRACTITIONER

## 2023-09-16 PROCEDURE — 1159F MED LIST DOCD IN RCRD: CPT | Performed by: NURSE PRACTITIONER

## 2023-09-16 PROCEDURE — 3008F BODY MASS INDEX DOCD: CPT | Performed by: NURSE PRACTITIONER

## 2023-09-16 PROCEDURE — 99213 OFFICE O/P EST LOW 20 MIN: CPT | Performed by: NURSE PRACTITIONER

## 2023-09-16 RX ORDER — AMOXICILLIN AND CLAVULANATE POTASSIUM 875; 125 MG/1; MG/1
1 TABLET, FILM COATED ORAL 2 TIMES DAILY
Qty: 8 TABLET | Refills: 0 | Status: SHIPPED | OUTPATIENT
Start: 2023-09-16 | End: 2023-09-20

## 2023-10-05 DIAGNOSIS — I10 ESSENTIAL HYPERTENSION: ICD-10-CM

## 2023-10-06 RX ORDER — LISINOPRIL AND HYDROCHLOROTHIAZIDE 25; 20 MG/1; MG/1
1 TABLET ORAL DAILY
Qty: 90 TABLET | Refills: 0 | Status: SHIPPED | OUTPATIENT
Start: 2023-10-06

## 2023-10-06 RX ORDER — METOPROLOL SUCCINATE 50 MG/1
50 TABLET, EXTENDED RELEASE ORAL DAILY
Qty: 90 TABLET | Refills: 0 | Status: SHIPPED | OUTPATIENT
Start: 2023-10-06

## 2023-10-06 NOTE — TELEPHONE ENCOUNTER
Requested Prescriptions     Pending Prescriptions Disp Refills    lisinopril-hydroCHLOROthiazide 20-25 MG Oral Tab 90 tablet 3     Sig: Take 1 tablet by mouth daily. metoprolol succinate ER 50 MG Oral Tablet 24 Hr 90 tablet 3     Sig: Take 1 tablet (50 mg total) by mouth daily. LOV 9/16/2023     Patient was asked to follow-up in: Follow-up not documented in note    Appointment scheduled: Visit date not found     Medication refilled per protocol.

## 2023-10-09 ENCOUNTER — LAB ENCOUNTER (OUTPATIENT)
Dept: LAB | Facility: HOSPITAL | Age: 73
End: 2023-10-09
Attending: FAMILY MEDICINE
Payer: MEDICARE

## 2023-10-09 ENCOUNTER — TELEPHONE (OUTPATIENT)
Dept: FAMILY MEDICINE CLINIC | Facility: CLINIC | Age: 73
End: 2023-10-09

## 2023-10-09 DIAGNOSIS — E55.9 VITAMIN D DEFICIENCY: ICD-10-CM

## 2023-10-09 DIAGNOSIS — E78.00 PURE HYPERCHOLESTEROLEMIA: ICD-10-CM

## 2023-10-09 DIAGNOSIS — R73.9 HYPERGLYCEMIA: ICD-10-CM

## 2023-10-09 DIAGNOSIS — E87.6 HYPOKALEMIA: Primary | ICD-10-CM

## 2023-10-09 LAB
ALBUMIN SERPL-MCNC: 3.5 G/DL (ref 3.4–5)
ALBUMIN/GLOB SERPL: 0.7 {RATIO} (ref 1–2)
ALP LIVER SERPL-CCNC: 66 U/L
ALT SERPL-CCNC: 35 U/L
ANION GAP SERPL CALC-SCNC: 5 MMOL/L (ref 0–18)
AST SERPL-CCNC: 15 U/L (ref 15–37)
BILIRUB SERPL-MCNC: 0.6 MG/DL (ref 0.1–2)
BUN BLD-MCNC: 12 MG/DL (ref 7–18)
CALCIUM BLD-MCNC: 8.9 MG/DL (ref 8.5–10.1)
CHLORIDE SERPL-SCNC: 101 MMOL/L (ref 98–112)
CHOLEST SERPL-MCNC: 170 MG/DL (ref ?–200)
CO2 SERPL-SCNC: 31 MMOL/L (ref 21–32)
CREAT BLD-MCNC: 0.97 MG/DL
EGFRCR SERPLBLD CKD-EPI 2021: 62 ML/MIN/1.73M2 (ref 60–?)
EST. AVERAGE GLUCOSE BLD GHB EST-MCNC: 151 MG/DL (ref 68–126)
FASTING PATIENT LIPID ANSWER: YES
FASTING STATUS PATIENT QL REPORTED: YES
GLOBULIN PLAS-MCNC: 5 G/DL (ref 2.8–4.4)
GLUCOSE BLD-MCNC: 119 MG/DL (ref 70–99)
HBA1C MFR BLD: 6.9 % (ref ?–5.7)
HDLC SERPL-MCNC: 44 MG/DL (ref 40–59)
LDLC SERPL CALC-MCNC: 101 MG/DL (ref ?–100)
NONHDLC SERPL-MCNC: 126 MG/DL (ref ?–130)
OSMOLALITY SERPL CALC.SUM OF ELEC: 285 MOSM/KG (ref 275–295)
POTASSIUM SERPL-SCNC: 2.6 MMOL/L (ref 3.5–5.1)
PROT SERPL-MCNC: 8.5 G/DL (ref 6.4–8.2)
SODIUM SERPL-SCNC: 137 MMOL/L (ref 136–145)
TRIGL SERPL-MCNC: 139 MG/DL (ref 30–149)
VIT D+METAB SERPL-MCNC: 53.8 NG/ML (ref 30–100)
VLDLC SERPL CALC-MCNC: 23 MG/DL (ref 0–30)

## 2023-10-09 PROCEDURE — 36415 COLL VENOUS BLD VENIPUNCTURE: CPT

## 2023-10-09 PROCEDURE — 82306 VITAMIN D 25 HYDROXY: CPT

## 2023-10-09 PROCEDURE — 80053 COMPREHEN METABOLIC PANEL: CPT

## 2023-10-09 PROCEDURE — 83036 HEMOGLOBIN GLYCOSYLATED A1C: CPT

## 2023-10-09 PROCEDURE — 80061 LIPID PANEL: CPT

## 2023-10-09 RX ORDER — POTASSIUM CHLORIDE 20 MEQ/1
40 TABLET, EXTENDED RELEASE ORAL 2 TIMES DAILY
Qty: 12 TABLET | Refills: 0 | Status: SHIPPED | OUTPATIENT
Start: 2023-10-09 | End: 2023-10-12

## 2023-10-09 NOTE — TELEPHONE ENCOUNTER
Delores from [facility] calling with critical result. Potassium 2.6 drawn at 10:12 a.m. 10/9/2023. LOV 9/16/2023 with Red MORRIS    Routed to Red MORRIS

## 2023-10-09 NOTE — TELEPHONE ENCOUNTER
Terry Dent from [facility] called with a critical lab result for potassium.     Please advise     CB# [phone]

## 2023-10-09 NOTE — TELEPHONE ENCOUNTER
I spoke with Rowan regarding her low potassium. She is having no cardiac symptoms. She states she feels great. Labs done today were ordered by  in March of 2023. Rowan is getting ready to move to [place],packing and moving boxes over the past  number of weeks. Her mom had a low potassium level for a long time. She was dx at [facility] with an inoperable tumor on the adrenal gland and had to take potassium the rest of her life.    I instructed Rowan that Red MORRIS ordered 40meq of Potassium to be taken twice a day for 3 days, making sure she takes 2 doses today, and repeat BMP in 3 days, on Thursday. If she develops any cardiac symptoms, she should go to the ED. Rowan verbalized understanding.     For your review  routed to Red MORRIS

## 2023-10-09 NOTE — TELEPHONE ENCOUNTER
Please triage patient, ensure no cardiac symptoms. If any cardiac symptoms should go straight to ER. If no symptoms take 40mEq potassium BID for 3 days (needs 2 doses today) and repeat BMP in 3 days. Thanks.

## 2023-10-12 ENCOUNTER — LAB ENCOUNTER (OUTPATIENT)
Dept: LAB | Age: 73
End: 2023-10-12
Attending: NURSE PRACTITIONER
Payer: MEDICARE

## 2023-10-12 DIAGNOSIS — E87.6 HYPOKALEMIA: ICD-10-CM

## 2023-10-12 LAB
ANION GAP SERPL CALC-SCNC: 4 MMOL/L (ref 0–18)
BUN BLD-MCNC: 16 MG/DL (ref 7–18)
CALCIUM BLD-MCNC: 8.9 MG/DL (ref 8.5–10.1)
CHLORIDE SERPL-SCNC: 102 MMOL/L (ref 98–112)
CO2 SERPL-SCNC: 28 MMOL/L (ref 21–32)
CREAT BLD-MCNC: 0.96 MG/DL
EGFRCR SERPLBLD CKD-EPI 2021: 63 ML/MIN/1.73M2 (ref 60–?)
FASTING STATUS PATIENT QL REPORTED: YES
GLUCOSE BLD-MCNC: 118 MG/DL (ref 70–99)
OSMOLALITY SERPL CALC.SUM OF ELEC: 280 MOSM/KG (ref 275–295)
POTASSIUM SERPL-SCNC: 3.8 MMOL/L (ref 3.5–5.1)
SODIUM SERPL-SCNC: 134 MMOL/L (ref 136–145)

## 2023-10-12 PROCEDURE — 80048 BASIC METABOLIC PNL TOTAL CA: CPT

## 2023-10-25 DIAGNOSIS — I10 ESSENTIAL HYPERTENSION: ICD-10-CM

## 2023-10-25 RX ORDER — BUSPIRONE HYDROCHLORIDE 15 MG/1
15 TABLET ORAL 3 TIMES DAILY
Qty: 270 TABLET | Refills: 10 | Status: SHIPPED | OUTPATIENT
Start: 2023-10-25

## 2023-10-25 NOTE — TELEPHONE ENCOUNTER
Refill request for:    Requested Prescriptions     Pending Prescriptions Disp Refills    BUSPIRONE 15 MG Oral Tab [Pharmacy Med Name: BUSPIRONE HYDROCHLORIDE 15 MG Tablet] 270 tablet 10     Sig: TAKE 1 TABLET THREE TIMES DAILY        Last Prescribed Quantity Refills   8/11/23 270 0     LOV 9/16/2023     Patient was asked to follow-up in: no follow ups on file. Appointment scheduled: 4/1/2024 Shiela Flores MD    Medication not on protocol.      # 270 with 0 refills routed to Tanner Shone, FNP for review

## 2023-10-27 RX ORDER — LISINOPRIL AND HYDROCHLOROTHIAZIDE 25; 20 MG/1; MG/1
1 TABLET ORAL DAILY
Qty: 90 TABLET | Refills: 3 | Status: SHIPPED | OUTPATIENT
Start: 2023-10-27

## 2023-10-27 RX ORDER — METOPROLOL SUCCINATE 50 MG/1
50 TABLET, EXTENDED RELEASE ORAL DAILY
Qty: 90 TABLET | Refills: 3 | Status: SHIPPED | OUTPATIENT
Start: 2023-10-27

## 2023-11-02 ENCOUNTER — LAB ENCOUNTER (OUTPATIENT)
Dept: LAB | Age: 73
End: 2023-11-02
Attending: NURSE PRACTITIONER
Payer: MEDICARE

## 2023-11-02 DIAGNOSIS — E87.6 HYPOKALEMIA: ICD-10-CM

## 2023-11-02 DIAGNOSIS — E87.6 HYPOKALEMIA: Primary | ICD-10-CM

## 2023-11-02 LAB — POTASSIUM SERPL-SCNC: 3.2 MMOL/L (ref 3.5–5.1)

## 2023-11-02 PROCEDURE — 84132 ASSAY OF SERUM POTASSIUM: CPT

## 2023-11-02 RX ORDER — POTASSIUM CHLORIDE 750 MG/1
10 TABLET, FILM COATED, EXTENDED RELEASE ORAL DAILY
Qty: 90 TABLET | Refills: 0 | Status: SHIPPED | OUTPATIENT
Start: 2023-11-02

## 2023-11-06 ENCOUNTER — OFFICE VISIT (OUTPATIENT)
Dept: FAMILY MEDICINE CLINIC | Facility: CLINIC | Age: 73
End: 2023-11-06
Payer: MEDICARE

## 2023-11-06 VITALS
RESPIRATION RATE: 16 BRPM | WEIGHT: 180 LBS | TEMPERATURE: 98 F | DIASTOLIC BLOOD PRESSURE: 80 MMHG | SYSTOLIC BLOOD PRESSURE: 130 MMHG | BODY MASS INDEX: 33 KG/M2 | OXYGEN SATURATION: 99 % | HEART RATE: 80 BPM

## 2023-11-06 DIAGNOSIS — I10 ESSENTIAL HYPERTENSION: ICD-10-CM

## 2023-11-06 DIAGNOSIS — E87.6 HYPOKALEMIA: Primary | ICD-10-CM

## 2023-11-06 PROCEDURE — 3075F SYST BP GE 130 - 139MM HG: CPT | Performed by: NURSE PRACTITIONER

## 2023-11-06 PROCEDURE — 99213 OFFICE O/P EST LOW 20 MIN: CPT | Performed by: NURSE PRACTITIONER

## 2023-11-06 PROCEDURE — 3079F DIAST BP 80-89 MM HG: CPT | Performed by: NURSE PRACTITIONER

## 2023-11-06 PROCEDURE — 1159F MED LIST DOCD IN RCRD: CPT | Performed by: NURSE PRACTITIONER

## 2023-11-06 RX ORDER — POTASSIUM CHLORIDE 750 MG/1
10 TABLET, FILM COATED, EXTENDED RELEASE ORAL DAILY
Qty: 30 TABLET | Refills: 0 | Status: SHIPPED | OUTPATIENT
Start: 2023-11-06

## 2023-11-06 RX ORDER — POTASSIUM CHLORIDE 750 MG/1
TABLET, EXTENDED RELEASE ORAL
COMMUNITY
Start: 2023-11-03

## 2023-11-27 ENCOUNTER — LAB ENCOUNTER (OUTPATIENT)
Dept: LAB | Age: 73
End: 2023-11-27
Attending: NURSE PRACTITIONER
Payer: MEDICARE

## 2023-11-27 DIAGNOSIS — E87.6 HYPOKALEMIA: ICD-10-CM

## 2023-11-27 LAB
ANION GAP SERPL CALC-SCNC: 6 MMOL/L (ref 0–18)
BUN BLD-MCNC: 13 MG/DL (ref 9–23)
CALCIUM BLD-MCNC: 9.5 MG/DL (ref 8.5–10.1)
CHLORIDE SERPL-SCNC: 98 MMOL/L (ref 98–112)
CO2 SERPL-SCNC: 34 MMOL/L (ref 21–32)
CREAT BLD-MCNC: 1.03 MG/DL
EGFRCR SERPLBLD CKD-EPI 2021: 58 ML/MIN/1.73M2 (ref 60–?)
FASTING STATUS PATIENT QL REPORTED: NO
GLUCOSE BLD-MCNC: 150 MG/DL (ref 70–99)
OSMOLALITY SERPL CALC.SUM OF ELEC: 289 MOSM/KG (ref 275–295)
POTASSIUM SERPL-SCNC: 3.2 MMOL/L (ref 3.5–5.1)
SODIUM SERPL-SCNC: 138 MMOL/L (ref 136–145)

## 2023-11-27 PROCEDURE — 80048 BASIC METABOLIC PNL TOTAL CA: CPT

## 2023-11-28 DIAGNOSIS — E87.6 HYPOKALEMIA: Primary | ICD-10-CM

## 2023-11-28 RX ORDER — POTASSIUM CHLORIDE 750 MG/1
20 TABLET, FILM COATED, EXTENDED RELEASE ORAL DAILY
Qty: 180 TABLET | Refills: 0 | Status: SHIPPED | OUTPATIENT
Start: 2023-11-28

## 2023-12-12 ENCOUNTER — LAB ENCOUNTER (OUTPATIENT)
Dept: LAB | Age: 73
End: 2023-12-12
Attending: NURSE PRACTITIONER
Payer: MEDICARE

## 2023-12-12 DIAGNOSIS — E87.6 HYPOKALEMIA: ICD-10-CM

## 2023-12-12 LAB
ANION GAP SERPL CALC-SCNC: 4 MMOL/L (ref 0–18)
BUN BLD-MCNC: 15 MG/DL (ref 9–23)
CALCIUM BLD-MCNC: 8.9 MG/DL (ref 8.5–10.1)
CHLORIDE SERPL-SCNC: 102 MMOL/L (ref 98–112)
CO2 SERPL-SCNC: 33 MMOL/L (ref 21–32)
CREAT BLD-MCNC: 1.17 MG/DL
EGFRCR SERPLBLD CKD-EPI 2021: 50 ML/MIN/1.73M2 (ref 60–?)
FASTING STATUS PATIENT QL REPORTED: NO
GLUCOSE BLD-MCNC: 154 MG/DL (ref 70–99)
OSMOLALITY SERPL CALC.SUM OF ELEC: 292 MOSM/KG (ref 275–295)
POTASSIUM SERPL-SCNC: 3.4 MMOL/L (ref 3.5–5.1)
SODIUM SERPL-SCNC: 139 MMOL/L (ref 136–145)

## 2023-12-12 PROCEDURE — 80048 BASIC METABOLIC PNL TOTAL CA: CPT

## 2023-12-13 DIAGNOSIS — E87.6 HYPOKALEMIA: Primary | ICD-10-CM

## 2023-12-21 RX ORDER — POTASSIUM CHLORIDE 750 MG/1
10 TABLET, EXTENDED RELEASE ORAL DAILY
Qty: 90 TABLET | Refills: 3 | OUTPATIENT
Start: 2023-12-21

## 2023-12-26 ENCOUNTER — LAB ENCOUNTER (OUTPATIENT)
Dept: LAB | Age: 73
End: 2023-12-26
Attending: NURSE PRACTITIONER
Payer: MEDICARE

## 2023-12-26 DIAGNOSIS — E87.6 HYPOKALEMIA: ICD-10-CM

## 2023-12-26 LAB — POTASSIUM SERPL-SCNC: 3.3 MMOL/L (ref 3.5–5.1)

## 2023-12-26 PROCEDURE — 84132 ASSAY OF SERUM POTASSIUM: CPT

## 2023-12-27 DIAGNOSIS — E87.6 HYPOKALEMIA: Primary | ICD-10-CM

## 2023-12-27 RX ORDER — POTASSIUM CHLORIDE 750 MG/1
20 TABLET, FILM COATED, EXTENDED RELEASE ORAL 2 TIMES DAILY
Qty: 360 TABLET | Refills: 0 | Status: SHIPPED | OUTPATIENT
Start: 2023-12-27

## 2024-01-11 ENCOUNTER — LAB ENCOUNTER (OUTPATIENT)
Dept: LAB | Age: 74
End: 2024-01-11
Attending: NURSE PRACTITIONER
Payer: MEDICARE

## 2024-01-11 DIAGNOSIS — E87.6 HYPOKALEMIA: ICD-10-CM

## 2024-01-11 LAB — POTASSIUM SERPL-SCNC: 3.6 MMOL/L (ref 3.5–5.1)

## 2024-01-11 PROCEDURE — 84132 ASSAY OF SERUM POTASSIUM: CPT

## 2024-02-14 DIAGNOSIS — F32.4 MAJOR DEPRESSIVE DISORDER WITH SINGLE EPISODE, IN PARTIAL REMISSION (HCC): ICD-10-CM

## 2024-02-14 NOTE — TELEPHONE ENCOUNTER
Refill request for:    Requested Prescriptions     Pending Prescriptions Disp Refills    VENLAFAXINE  MG Oral Capsule SR 24 Hr [Pharmacy Med Name: VENLAFAXINE HYDROCHLORIDEER 150 MG Capsule Extended Release 24 Hour] 90 capsule 3     Sig: TAKE 1 CAPSULE EVERY DAY        Last Prescribed Quantity Refills   2/11/23 90 3     LOV 11/6/2023     Patient was asked to follow-up in: no follow ups on file.    Appointment scheduled: 4/1/2024 Yue Boo MD    Medication not on protocol.     # 90 with 3 refills routed to Taylor Boo MD for review

## 2024-02-15 RX ORDER — VENLAFAXINE HYDROCHLORIDE 150 MG/1
150 CAPSULE, EXTENDED RELEASE ORAL DAILY
Qty: 90 CAPSULE | Refills: 3 | Status: SHIPPED | OUTPATIENT
Start: 2024-02-15

## 2024-03-11 RX ORDER — POTASSIUM CHLORIDE 750 MG/1
20 TABLET, EXTENDED RELEASE ORAL 2 TIMES DAILY
Qty: 360 TABLET | Refills: 3 | Status: SHIPPED | OUTPATIENT
Start: 2024-03-11

## 2024-03-11 NOTE — TELEPHONE ENCOUNTER
Refill request for:    Requested Prescriptions     Pending Prescriptions Disp Refills    POTASSIUM CHLORIDE 10 MEQ Oral Tab CR [Pharmacy Med Name: POTASSIUM CHLORIDE ER 10 MEQ Tablet Extended Release] 360 tablet 3     Sig: TAKE 2 TABLETS BY MOUTH 2 (TWO) TIMES DAILY.        Last Prescribed Quantity Refills   12/27/2023 360 0     LOV 11/6/2023     Patient was asked to follow-up in:     Appointment due:     Appointment scheduled: 4/1/2024 Yue Boo MD    Medication not on protocol.     Routed to [facility]

## 2024-04-01 ENCOUNTER — LAB ENCOUNTER (OUTPATIENT)
Dept: LAB | Age: 74
End: 2024-04-01
Attending: FAMILY MEDICINE
Payer: MEDICARE

## 2024-04-01 ENCOUNTER — OFFICE VISIT (OUTPATIENT)
Dept: FAMILY MEDICINE CLINIC | Facility: CLINIC | Age: 74
End: 2024-04-01
Payer: MEDICARE

## 2024-04-01 VITALS
RESPIRATION RATE: 16 BRPM | SYSTOLIC BLOOD PRESSURE: 144 MMHG | TEMPERATURE: 97 F | HEIGHT: 62 IN | BODY MASS INDEX: 32.57 KG/M2 | HEART RATE: 92 BPM | WEIGHT: 177 LBS | DIASTOLIC BLOOD PRESSURE: 86 MMHG

## 2024-04-01 DIAGNOSIS — Z78.0 POSTMENOPAUSAL ESTROGEN DEFICIENCY: ICD-10-CM

## 2024-04-01 DIAGNOSIS — R73.9 HYPERGLYCEMIA: ICD-10-CM

## 2024-04-01 DIAGNOSIS — F32.4 MAJOR DEPRESSIVE DISORDER WITH SINGLE EPISODE, IN PARTIAL REMISSION (HCC): ICD-10-CM

## 2024-04-01 DIAGNOSIS — Z00.00 ENCOUNTER FOR ANNUAL HEALTH EXAMINATION: Primary | ICD-10-CM

## 2024-04-01 DIAGNOSIS — E78.00 PURE HYPERCHOLESTEROLEMIA: ICD-10-CM

## 2024-04-01 DIAGNOSIS — I10 ESSENTIAL HYPERTENSION: ICD-10-CM

## 2024-04-01 DIAGNOSIS — G47.33 OSA (OBSTRUCTIVE SLEEP APNEA): ICD-10-CM

## 2024-04-01 DIAGNOSIS — E55.9 VITAMIN D DEFICIENCY: ICD-10-CM

## 2024-04-01 DIAGNOSIS — M81.0 AGE-RELATED OSTEOPOROSIS WITHOUT CURRENT PATHOLOGICAL FRACTURE: ICD-10-CM

## 2024-04-01 DIAGNOSIS — Z12.31 VISIT FOR SCREENING MAMMOGRAM: ICD-10-CM

## 2024-04-01 DIAGNOSIS — R92.8 ABNORMAL MRI, BREAST: ICD-10-CM

## 2024-04-01 DIAGNOSIS — Z86.018: ICD-10-CM

## 2024-04-01 DIAGNOSIS — J47.9 BRONCHIECTASIS WITHOUT COMPLICATION (HCC): ICD-10-CM

## 2024-04-01 LAB
ALBUMIN SERPL-MCNC: 3.7 G/DL (ref 3.4–5)
ALBUMIN/GLOB SERPL: 0.7 {RATIO} (ref 1–2)
ALP LIVER SERPL-CCNC: 70 U/L
ALT SERPL-CCNC: 27 U/L
ANION GAP SERPL CALC-SCNC: 5 MMOL/L (ref 0–18)
AST SERPL-CCNC: 20 U/L (ref 15–37)
BILIRUB SERPL-MCNC: 0.4 MG/DL (ref 0.1–2)
BUN BLD-MCNC: 12 MG/DL (ref 9–23)
CALCIUM BLD-MCNC: 8.9 MG/DL (ref 8.5–10.1)
CHLORIDE SERPL-SCNC: 101 MMOL/L (ref 98–112)
CHOLEST SERPL-MCNC: 183 MG/DL (ref ?–200)
CO2 SERPL-SCNC: 34 MMOL/L (ref 21–32)
CREAT BLD-MCNC: 1.12 MG/DL
CREAT UR-SCNC: 129 MG/DL
EGFRCR SERPLBLD CKD-EPI 2021: 53 ML/MIN/1.73M2 (ref 60–?)
EST. AVERAGE GLUCOSE BLD GHB EST-MCNC: 151 MG/DL (ref 68–126)
FASTING PATIENT LIPID ANSWER: NO
FASTING STATUS PATIENT QL REPORTED: NO
GLOBULIN PLAS-MCNC: 5 G/DL (ref 2.8–4.4)
GLUCOSE BLD-MCNC: 103 MG/DL (ref 70–99)
HBA1C MFR BLD: 6.9 % (ref ?–5.7)
HDLC SERPL-MCNC: 46 MG/DL (ref 40–59)
LDLC SERPL CALC-MCNC: 106 MG/DL (ref ?–100)
MICROALBUMIN UR-MCNC: 9.99 MG/DL
MICROALBUMIN/CREAT 24H UR-RTO: 77.4 UG/MG (ref ?–30)
NONHDLC SERPL-MCNC: 137 MG/DL (ref ?–130)
OSMOLALITY SERPL CALC.SUM OF ELEC: 290 MOSM/KG (ref 275–295)
POTASSIUM SERPL-SCNC: 3.3 MMOL/L (ref 3.5–5.1)
PROT SERPL-MCNC: 8.7 G/DL (ref 6.4–8.2)
SODIUM SERPL-SCNC: 140 MMOL/L (ref 136–145)
TRIGL SERPL-MCNC: 176 MG/DL (ref 30–149)
VIT D+METAB SERPL-MCNC: 61.6 NG/ML (ref 30–100)
VLDLC SERPL CALC-MCNC: 30 MG/DL (ref 0–30)

## 2024-04-01 PROCEDURE — 80061 LIPID PANEL: CPT

## 2024-04-01 PROCEDURE — 83036 HEMOGLOBIN GLYCOSYLATED A1C: CPT

## 2024-04-01 PROCEDURE — 82306 VITAMIN D 25 HYDROXY: CPT

## 2024-04-01 PROCEDURE — 82043 UR ALBUMIN QUANTITATIVE: CPT

## 2024-04-01 PROCEDURE — 82570 ASSAY OF URINE CREATININE: CPT

## 2024-04-01 PROCEDURE — 36415 COLL VENOUS BLD VENIPUNCTURE: CPT

## 2024-04-01 PROCEDURE — 80053 COMPREHEN METABOLIC PANEL: CPT

## 2024-04-01 NOTE — PATIENT INSTRUCTIONS
Rowan Pizarro's SCREENING SCHEDULE   Tests on this list are recommended by your physician but may not be covered, or covered at this frequency, by your insurer.   Please check with your insurance carrier before scheduling to verify coverage.   PREVENTATIVE SERVICES FREQUENCY &  COVERAGE DETAILS LAST COMPLETION DATE   Diabetes Screening    Fasting Blood Sugar /  Glucose    One screening every 12 months if never tested or if previously tested but not diagnosed with pre-diabetes   One screening every 6 months if diagnosed with pre-diabetes Lab Results   Component Value Date    GLUCOSE 104 (H) 06/23/2015     (H) 12/12/2023        Cardiovascular Disease Screening    Lipid Panel  Cholesterol  Lipoprotein (HDL)  Triglycerides Covered every 5 years for all Medicare beneficiaries without apparent signs or symptoms of cardiovascular disease Lab Results   Component Value Date    CHOLEST 170 10/09/2023    HDL 44 10/09/2023     (H) 10/09/2023    TRIG 139 10/09/2023         Electrocardiogram (EKG)   Covered if needed at Welcome to Medicare, and non-screening if indicated for medical reasons 08/12/2016      Ultrasound Screening for Abdominal Aortic Aneurysm (AAA) Covered once in a lifetime for one of the following risk factors   • Men who are 65-75 years old and have ever smoked   • Anyone with a family history -     Colorectal Cancer Screening  Covered for ages 50-85; only need ONE of the following:    Colonoscopy   Covered every 10 years    Covered every 2 years if patient is at high risk or previous colonoscopy was abnormal 10/05/2021    Health Maintenance   Topic Date Due   • Colorectal Cancer Screening  10/05/2031       Flexible Sigmoidoscopy   Covered every 4 years -    Fecal Occult Blood Test Covered annually -   Bone Density Screening    Bone density screening    Covered every 2 years after age 65 if diagnosed with risk of osteoporosis or estrogen deficiency.    Covered yearly for long-term  glucocorticoid medication use (Steroids) Last Dexa Scan:    XR DEXA BONE DENSITOMETRY (CPT=77080) 06/13/2022      No recommendations at this time   Pap and Pelvic    Pap   Covered every 2 years for women at normal risk; Annually if at high risk -  No recommendations at this time    Chlamydia Annually if high risk -  No recommendations at this time   Screening Mammogram    Mammogram     Recommend annually for all female patients aged 40 and older    One baseline mammogram covered for patients aged 35-39 06/13/2022    Health Maintenance   Topic Date Due   • Mammogram  06/13/2023       Immunizations    Influenza Covered once per flu season  Please get every year 08/29/2023  No recommendations at this time    Pneumococcal Each vaccine ([id number] & [id number]) covered once after 65 Prevnar 13: 06/29/2015    [id number]: 10/26/2017     No recommendations at this time    Hepatitis B One screening covered for patients with certain risk factors   -  No recommendations at this time    Tetanus Toxoid Not covered by Medicare Part B unless medically necessary (cut with metal); may be covered with your pharmacy prescription benefits -    Tetanus, Diptheria and Pertusis TD and TDaP Not covered by Medicare Part B -  No recommendations at this time    Zoster Not covered by Medicare Part B; may be covered with your pharmacy  prescription benefits 08/08/2012  No recommendations at this time     Annual Monitoring of Persistent Medications (ACE/ARB, digoxin diuretics, anticonvulsants)    Potassium Annually Lab Results   Component Value Date    K 3.6 01/11/2024         Creatinine   Annually Lab Results   Component Value Date    CREATSERUM 1.17 (H) 12/12/2023         BUN Annually Lab Results   Component Value Date    BUN 15 12/12/2023       Drug Serum Conc Annually No results found for: \"DIGOXIN\", \"DIG\", \"VALP\"
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
## 2024-06-03 ENCOUNTER — HOSPITAL ENCOUNTER (OUTPATIENT)
Dept: MAMMOGRAPHY | Age: 74
Discharge: HOME | End: 2024-06-03
Attending: FAMILY MEDICINE
Payer: MEDICARE

## 2024-06-03 ENCOUNTER — HOSPITAL ENCOUNTER (OUTPATIENT)
Dept: BONE DENSITY | Age: 74
Discharge: HOME | End: 2024-06-03
Attending: FAMILY MEDICINE
Payer: MEDICARE

## 2024-06-03 DIAGNOSIS — Z12.31 VISIT FOR SCREENING MAMMOGRAM: ICD-10-CM

## 2024-06-03 DIAGNOSIS — Z78.0 POSTMENOPAUSAL ESTROGEN DEFICIENCY: ICD-10-CM

## 2024-06-03 PROCEDURE — 77067 SCR MAMMO BI INCL CAD: CPT | Performed by: FAMILY MEDICINE

## 2024-06-03 PROCEDURE — 77080 DXA BONE DENSITY AXIAL: CPT | Performed by: FAMILY MEDICINE

## 2024-06-03 PROCEDURE — 77063 BREAST TOMOSYNTHESIS BI: CPT | Performed by: FAMILY MEDICINE

## 2024-06-04 ENCOUNTER — TELEPHONE (OUTPATIENT)
Dept: FAMILY MEDICINE CLINIC | Facility: CLINIC | Age: 74
End: 2024-06-04

## 2024-06-04 NOTE — TELEPHONE ENCOUNTER
Chantelle from Radiology department called and needing a nurse to call back to confirm they received this message.     Patient was called to schedule another diagnostic image on her left breast. Patient said she doesn't want to schedule and to not be called regarding to schedule.    Please call Chantelle at [phone]

## 2024-09-09 ENCOUNTER — TELEPHONE (OUTPATIENT)
Dept: FAMILY MEDICINE CLINIC | Facility: CLINIC | Age: 74
End: 2024-09-09

## 2024-09-09 NOTE — TELEPHONE ENCOUNTER
Patient called regarding remember Dr. Boo saying that she wanted her to repeat Hemoglobin A1C to compare from the 4/1/2024 lab work. Patient is scheduled with her on 10/11/2024 with Dr. Boo

## 2024-09-13 NOTE — TELEPHONE ENCOUNTER
Called and talked to the patient and told her we will do A1c in the office at her appointment. Then she said she does not believe her BP meds are working as [place] [facility] [place] her BP runs about 165/?. I told her to take her BP daily at home first thing in the morning for the next week then send those results into Dr Boo via My chart.

## 2024-09-21 ENCOUNTER — PATIENT MESSAGE (OUTPATIENT)
Dept: FAMILY MEDICINE CLINIC | Facility: CLINIC | Age: 74
End: 2024-09-21

## 2024-09-23 RX ORDER — HYDROCHLOROTHIAZIDE 25 MG/1
25 TABLET ORAL DAILY
Qty: 90 TABLET | Refills: 0 | Status: SHIPPED | OUTPATIENT
Start: 2024-09-23

## 2024-09-23 RX ORDER — HYDROCHLOROTHIAZIDE 25 MG/1
25 TABLET ORAL DAILY
Qty: 30 TABLET | Refills: 0
Start: 2024-09-23

## 2024-09-23 RX ORDER — LISINOPRIL 40 MG/1
40 TABLET ORAL DAILY
Qty: 30 TABLET | Refills: 0
Start: 2024-09-23

## 2024-09-23 RX ORDER — LISINOPRIL 40 MG/1
40 TABLET ORAL DAILY
Qty: 90 TABLET | Refills: 0 | Status: SHIPPED | OUTPATIENT
Start: 2024-09-23

## 2024-09-23 NOTE — TELEPHONE ENCOUNTER
From: Rowan Pizarro  To: Yue Boo  Sent: 2024 2:28 PM CDT  Subject: High Blood Pressure Results    As your nurse advised me, Dr. Boo, I took my blood pressure for a week in the morning and at night. It has been high for a few months, maybe more. I report the lowest of about 3 or 4 takes, the first ones being quite higher, so did it until I got lower numbers, which are still high.  13 Night: 144/92  14 Mornin/101 Night: 151/93  9-15 Mornin/87 Night: 146/91   Mornin/90 Night: 141/92   Mornin/94 Night: 144/92   Mornin/105    Mornin/95 Night: 146/95   Mornin/95 Night: 140/95  The next time I'm scheduled to see you is .  Sincerely, Rowan Pizarro

## 2024-09-27 RX ORDER — METOPROLOL SUCCINATE 50 MG/1
50 TABLET, EXTENDED RELEASE ORAL DAILY
Qty: 90 TABLET | Refills: 3 | Status: SHIPPED | OUTPATIENT
Start: 2024-09-27

## 2024-09-27 NOTE — TELEPHONE ENCOUNTER
Refill request for:    Requested Prescriptions     Pending Prescriptions Disp Refills    METOPROLOL SUCCINATE ER 50 MG Oral Tablet 24 Hr [Pharmacy Med Name: Metoprolol Succinate ER Oral Tablet Extended Release 24 Hour 50 MG] 90 tablet 3     Sig: TAKE 1 TABLET EVERY DAY      Hypertension Medications Protocol [id number]/27/2024 05:15 AM   Protocol Details Last BP reading less than 140/90    CMP or BMP in past 12 months    In person appointment or virtual visit in the past 12 mos or appointment in next 3 mos    EGFRCR or GFRNAA > 5     Last Prescribed Quantity Refills   10/27/23 90 3     LOV 4/1/2024     Patient was asked to follow-up in: 6 months    Appointment due: October 2024    Appointment scheduled: 10/11/2024 Yue Boo MD    Medication not on protocol.     # 90 with 3 refills routed to Taylor Boo MD for review

## 2024-10-11 ENCOUNTER — OFFICE VISIT (OUTPATIENT)
Dept: FAMILY MEDICINE CLINIC | Facility: CLINIC | Age: 74
End: 2024-10-11
Payer: MEDICARE

## 2024-10-11 VITALS
WEIGHT: 170 LBS | RESPIRATION RATE: 16 BRPM | SYSTOLIC BLOOD PRESSURE: 138 MMHG | BODY MASS INDEX: 31 KG/M2 | HEART RATE: 84 BPM | DIASTOLIC BLOOD PRESSURE: 82 MMHG | TEMPERATURE: 98 F

## 2024-10-11 DIAGNOSIS — I10 ESSENTIAL HYPERTENSION: Primary | ICD-10-CM

## 2024-10-11 DIAGNOSIS — R73.9 HYPERGLYCEMIA: ICD-10-CM

## 2024-10-11 DIAGNOSIS — M81.0 AGE-RELATED OSTEOPOROSIS WITHOUT CURRENT PATHOLOGICAL FRACTURE: ICD-10-CM

## 2024-10-11 LAB — HEMOGLOBIN A1C: 6.3 % (ref 4.3–5.6)

## 2024-10-11 PROCEDURE — 3079F DIAST BP 80-89 MM HG: CPT | Performed by: FAMILY MEDICINE

## 2024-10-11 PROCEDURE — 83036 HEMOGLOBIN GLYCOSYLATED A1C: CPT | Performed by: FAMILY MEDICINE

## 2024-10-11 PROCEDURE — 1159F MED LIST DOCD IN RCRD: CPT | Performed by: FAMILY MEDICINE

## 2024-10-11 PROCEDURE — 99214 OFFICE O/P EST MOD 30 MIN: CPT | Performed by: FAMILY MEDICINE

## 2024-10-11 PROCEDURE — 1160F RVW MEDS BY RX/DR IN RCRD: CPT | Performed by: FAMILY MEDICINE

## 2024-10-11 PROCEDURE — 3075F SYST BP GE 130 - 139MM HG: CPT | Performed by: FAMILY MEDICINE

## 2024-10-11 NOTE — PROGRESS NOTES
Rowan Pizarro is a 74 year old female.     HPI:   Patient presents for a medication follow up.      Hx of abnormal mammogram:  MRI in 2020 and 2021.  Biopsy declined by patient.  Saw Dr. Samayoa in 2021 - planning for annual mammogram.  Pt has declined additional images.     Osteoporosis.  On Risdronate since [id number](?).  Was on Alendronate from 5/2017 - 6/2022.  She takes calcium/mag and Vit D.  Takes bone health vitamin regimen from [facility].  Last DEXA 6/2024    HTN:  Lisinopril 40mg, HCTZ 25 daily and Metoprolol 50mg daily.  Home readings still elevated, better in office today.     Depression:  Treated with Citalopram since her early 50s, weaned off in 2021 and now on Venlafaxine.  Now on 150mg daily. Seeing psychology    Vaginal atrophy:  Topical estrace.    Vit D def:  Takes supplement.    Hx of parathyroid tumor; surgically resected    CATE:  Uses CPAP.      Hyperglycemia:  A1c 6.3, previously 6.9, 6.3, 6.3, 6.4, 6.3, 6.5.     Hyperlipidemia: no medication.  - no medication.    Heart scan 2/2023 0.     Last pap:  1/2016  Last mammogram: 6/2024  Previous colonoscopy:  10/2021 - repeat in 10 years.   Previous DEXA: 6/2024 - osteopenia  Family hx of cancer:  Brother melanoma, brother prostate  Tdap:  Unsure  Flu:  Yearly  Prevnar:  6/2015  Pneumovax:  10/2017  Shingrix:  9/2020, 12/2020  COVID:  yes    Patient Active Problem List   Diagnosis    CATE (obstructive sleep apnea)    Vitamin D deficiency    Hx of benign neoplasm of parathyroid gland    Hyperglycemia    Essential hypertension    Osteoporosis    Major depressive disorder with single episode, in partial remission (HCC)    Bronchiectasis without complication (HCC) - CT 2015    Pure hypercholesterolemia    Abnormal MRI, breast       Wt Readings from Last 6 Encounters:   10/11/24 170 lb (77.1 kg)   04/01/24 177 lb (80.3 kg)   11/06/23 180 lb (81.6 kg)   09/16/23 175 lb (79.4 kg)   08/11/23 174 lb 9.6 oz (79.2 kg)   07/21/23 174 lb  (78.9 kg)     Body mass index is 31.09 kg/m².    REVIEW OF SYSTEMS:   GENERAL HEALTH: feels well otherwise  RESPIRATORY: denies shortness of breath  CARDIOVASCULAR: denies chest pain, denies palpitation, denies pedal edema  GI: denies abdominal pain  NEURO: denies headaches    EXAM:   /82   Pulse 84   Temp 98 °F (36.7 °C)   Resp 16   Wt 170 lb (77.1 kg)   BMI 31.09 kg/m²   GENERAL: well developed, well nourished,in no apparent distress  NECK: supple,no adenopathy,no carotid bruits, no thyromegaly  LUNGS: clear to auscultation  CARDIO: RRR without murmur  EXT:  No pedal edema    ASSESSMENT AND PLAN:   1. Essential hypertension  Pt to continue checking at home.  Reviewed BP goals of <140/90.  Update me via [employer]    2. Hyperglycemia  A1c improved  - POC Hemoglobin A1C    3. Age-related osteoporosis without current pathological fracture  Reviewed DEXA .  Stay off bisphosphonate for now.     Return in 6 months for medication wellness.   Living in [place], may transfer care there in the future for convenience.     Orders Placed This Encounter   Procedures    POC Hemoglobin A1C     Meds & Refills for this Visit:  Requested Prescriptions      No prescriptions requested or ordered in this encounter     Imaging & Consults:  None

## 2024-12-04 DIAGNOSIS — F32.4 MAJOR DEPRESSIVE DISORDER WITH SINGLE EPISODE, IN PARTIAL REMISSION (HCC): ICD-10-CM

## 2024-12-04 RX ORDER — VENLAFAXINE HYDROCHLORIDE 150 MG/1
150 CAPSULE, EXTENDED RELEASE ORAL DAILY
Qty: 90 CAPSULE | Refills: 3 | Status: SHIPPED | OUTPATIENT
Start: 2024-12-04

## 2024-12-04 NOTE — TELEPHONE ENCOUNTER
Requested Prescriptions     Pending Prescriptions Disp Refills    VENLAFAXINE  MG Oral Capsule SR 24 Hr [Pharmacy Med Name: Venlafaxine HCl ER Oral Capsule Extended Release 24 Hour 150 MG] 90 capsule 3     Sig: TAKE 1 CAPSULE EVERY DAY     LOV 10/11/2024     Patient was asked to follow-up in: 6 months    Appointment scheduled: Visit date not found     Medication refilled per protocol.

## 2024-12-09 RX ORDER — LISINOPRIL 40 MG/1
40 TABLET ORAL DAILY
Qty: 90 TABLET | Refills: 1 | Status: SHIPPED | OUTPATIENT
Start: 2024-12-09

## 2024-12-09 RX ORDER — HYDROCHLOROTHIAZIDE 25 MG/1
25 TABLET ORAL DAILY
Qty: 90 TABLET | Refills: 1 | Status: SHIPPED | OUTPATIENT
Start: 2024-12-09

## 2024-12-09 NOTE — TELEPHONE ENCOUNTER
Requested Prescriptions     Pending Prescriptions Disp Refills    HYDROCHLOROTHIAZIDE 25 MG Oral Tab [Pharmacy Med Name: hydroCHLOROthiazide Oral Tablet 25 MG] 90 tablet 3     Sig: TAKE 1 TABLET EVERY DAY    LISINOPRIL 40 MG Oral Tab [Pharmacy Med Name: Lisinopril Oral Tablet 40 MG] 90 tablet 3     Sig: TAKE 1 TABLET EVERY DAY     LOV 10/11/2024     Patient was asked to follow-up in: 1 year    Appointment scheduled: Visit date not found     Medication refilled per protocol.

## 2024-12-30 RX ORDER — POTASSIUM CHLORIDE 750 MG/1
20 TABLET, EXTENDED RELEASE ORAL 2 TIMES DAILY
Qty: 360 TABLET | Refills: 3 | Status: SHIPPED | OUTPATIENT
Start: 2024-12-30

## 2024-12-30 NOTE — TELEPHONE ENCOUNTER
Refill request for:    Requested Prescriptions     Pending Prescriptions Disp Refills    POTASSIUM CHLORIDE 10 MEQ Oral Tab CR [Pharmacy Med Name: Potassium Chloride Magali ER Oral Tablet Extended Release 10 MEQ] 360 tablet 3     Sig: TAKE 2 TABLETS TWICE DAILY        Last Prescribed Quantity Refills   3/11/24 360 3     LOV 10/11/2024     Patient was asked to follow-up in: 6 months    Appointment scheduled: Visit date not found    Medication not on protocol.     Pt due for refill around 2/14    # 360 with 3 refills routed to ARTURO Cabral for review

## 2025-01-14 ENCOUNTER — TELEPHONE (OUTPATIENT)
Dept: FAMILY MEDICINE CLINIC | Facility: CLINIC | Age: 75
End: 2025-01-14

## 2025-01-14 NOTE — TELEPHONE ENCOUNTER
Pt called requesting us to send a Medical Records release form to her via mail to address   [address] [place] [address], [place] [address]

## 2025-04-30 RX ORDER — LISINOPRIL 40 MG/1
40 TABLET ORAL DAILY
Qty: 90 TABLET | Refills: 0 | Status: SHIPPED | OUTPATIENT
Start: 2025-04-30

## 2025-04-30 NOTE — TELEPHONE ENCOUNTER
Requested Prescriptions     Pending Prescriptions Disp Refills    LISINOPRIL 40 MG Oral Tab [Pharmacy Med Name: Lisinopril Oral Tablet 40 MG] 90 tablet 3     Sig: TAKE 1 TABLET EVERY DAY     LOV 10/11/2024     Patient was asked to follow-up in: Follow-up not documented in note    Appointment scheduled: Visit date not found     Medication refilled per protocol.

## 2025-05-02 ENCOUNTER — TELEPHONE (OUTPATIENT)
Dept: FAMILY MEDICINE CLINIC | Facility: CLINIC | Age: 75
End: 2025-05-02

## 2025-05-05 ENCOUNTER — PATIENT OUTREACH (OUTPATIENT)
Dept: CASE MANAGEMENT | Age: 75
End: 2025-05-05

## 2025-05-05 RX ORDER — HYDROCHLOROTHIAZIDE 25 MG/1
25 TABLET ORAL DAILY
Qty: 90 TABLET | Refills: 3 | OUTPATIENT
Start: 2025-05-05

## 2025-05-05 NOTE — PROCEDURES
The office order for PCP removal request is Approved and finalized on May 5, 2025.    Removed Yue Boo MD as the patient's Primary Care Physician

## (undated) DIAGNOSIS — M81.0 AGE-RELATED OSTEOPOROSIS WITHOUT CURRENT PATHOLOGICAL FRACTURE: ICD-10-CM

## (undated) NOTE — MR AVS SNAPSHOT
[address] [facility]  [facility],  [address]  [address] [place] [address] [phone]               Thank you for choosing us for your health care visit with Robb Means MD.  We are glad to serve you and happy to provide you with this [place] Osteoporosis     Depression           Medical Issues Discussed Today     Essential hypertension    Hyperglycemia    Osteoporosis    Vitamin D deficiency    Visit for screening mammogram    -  Primary    Pure hypercholesterolemia          Instructions and You can access your MyChart to more actively manage your health care and view more details from this visit by going to [email or web address].   If you've recently had a stay at the Hospital you can access your discharge instructions in [address] by alayna You don’t need to join a gym. Home exercises work great.  Put more priority on exercise in your life                    Visit [facility] online at  [email or web address]